# Patient Record
Sex: FEMALE | Race: WHITE | NOT HISPANIC OR LATINO | Employment: PART TIME
[De-identification: names, ages, dates, MRNs, and addresses within clinical notes are randomized per-mention and may not be internally consistent; named-entity substitution may affect disease eponyms.]

---

## 2017-01-09 ENCOUNTER — RECORDS - HEALTHEAST (OUTPATIENT)
Dept: ADMINISTRATIVE | Facility: OTHER | Age: 62
End: 2017-01-09

## 2017-01-23 ENCOUNTER — RECORDS - HEALTHEAST (OUTPATIENT)
Dept: ADMINISTRATIVE | Facility: OTHER | Age: 62
End: 2017-01-23

## 2017-04-17 ENCOUNTER — RECORDS - HEALTHEAST (OUTPATIENT)
Dept: LAB | Facility: CLINIC | Age: 62
End: 2017-04-17

## 2017-04-17 ENCOUNTER — RECORDS - HEALTHEAST (OUTPATIENT)
Dept: ADMINISTRATIVE | Facility: OTHER | Age: 62
End: 2017-04-17

## 2017-04-17 LAB
CHOLEST SERPL-MCNC: 214 MG/DL
FASTING STATUS PATIENT QL REPORTED: YES
HDLC SERPL-MCNC: 97 MG/DL
LDLC SERPL CALC-MCNC: 103 MG/DL
TRIGL SERPL-MCNC: 69 MG/DL

## 2017-04-25 LAB
HPV INTERPRETATION - HISTORICAL: NORMAL
HPV INTERPRETER - HISTORICAL: NORMAL

## 2017-04-28 ENCOUNTER — RECORDS - HEALTHEAST (OUTPATIENT)
Dept: ADMINISTRATIVE | Facility: OTHER | Age: 62
End: 2017-04-28

## 2017-05-26 ENCOUNTER — RECORDS - HEALTHEAST (OUTPATIENT)
Dept: ADMINISTRATIVE | Facility: OTHER | Age: 62
End: 2017-05-26

## 2017-07-06 ENCOUNTER — RECORDS - HEALTHEAST (OUTPATIENT)
Dept: ADMINISTRATIVE | Facility: OTHER | Age: 62
End: 2017-07-06

## 2017-10-25 ENCOUNTER — RECORDS - HEALTHEAST (OUTPATIENT)
Dept: ADMINISTRATIVE | Facility: OTHER | Age: 62
End: 2017-10-25

## 2018-04-26 ENCOUNTER — RECORDS - HEALTHEAST (OUTPATIENT)
Dept: ADMINISTRATIVE | Facility: OTHER | Age: 63
End: 2018-04-26

## 2018-05-07 ENCOUNTER — COMMUNICATION - HEALTHEAST (OUTPATIENT)
Dept: FAMILY MEDICINE | Facility: CLINIC | Age: 63
End: 2018-05-07

## 2018-05-07 ENCOUNTER — OFFICE VISIT - HEALTHEAST (OUTPATIENT)
Dept: FAMILY MEDICINE | Facility: CLINIC | Age: 63
End: 2018-05-07

## 2018-05-07 DIAGNOSIS — M81.0 OSTEOPOROSIS: ICD-10-CM

## 2018-05-07 DIAGNOSIS — Z85.3 PERSONAL HISTORY OF BREAST CANCER: ICD-10-CM

## 2018-05-07 DIAGNOSIS — Z00.00 ROUTINE GENERAL MEDICAL EXAMINATION AT A HEALTH CARE FACILITY: ICD-10-CM

## 2018-05-07 DIAGNOSIS — Z12.11 SCREEN FOR COLON CANCER: ICD-10-CM

## 2018-05-07 LAB
ALBUMIN SERPL-MCNC: 3.8 G/DL (ref 3.5–5)
ALP SERPL-CCNC: 87 U/L (ref 45–120)
ALT SERPL W P-5'-P-CCNC: 24 U/L (ref 0–45)
ANION GAP SERPL CALCULATED.3IONS-SCNC: 11 MMOL/L (ref 5–18)
AST SERPL W P-5'-P-CCNC: 23 U/L (ref 0–40)
BASOPHILS # BLD AUTO: 0 THOU/UL (ref 0–0.2)
BASOPHILS NFR BLD AUTO: 0 % (ref 0–2)
BILIRUB SERPL-MCNC: 0.5 MG/DL (ref 0–1)
BUN SERPL-MCNC: 13 MG/DL (ref 8–22)
CALCIUM SERPL-MCNC: 9.7 MG/DL (ref 8.5–10.5)
CHLORIDE BLD-SCNC: 107 MMOL/L (ref 98–107)
CHOLEST SERPL-MCNC: 221 MG/DL
CO2 SERPL-SCNC: 24 MMOL/L (ref 22–31)
CREAT SERPL-MCNC: 0.65 MG/DL (ref 0.6–1.1)
EOSINOPHIL # BLD AUTO: 0.1 THOU/UL (ref 0–0.4)
EOSINOPHIL NFR BLD AUTO: 2 % (ref 0–6)
ERYTHROCYTE [DISTWIDTH] IN BLOOD BY AUTOMATED COUNT: 10.7 % (ref 11–14.5)
FASTING STATUS PATIENT QL REPORTED: ABNORMAL
GFR SERPL CREATININE-BSD FRML MDRD: >60 ML/MIN/1.73M2
GLUCOSE BLD-MCNC: 95 MG/DL (ref 70–125)
HCT VFR BLD AUTO: 42.2 % (ref 35–47)
HDLC SERPL-MCNC: 100 MG/DL
HGB BLD-MCNC: 14 G/DL (ref 12–16)
LDLC SERPL CALC-MCNC: 105 MG/DL
LYMPHOCYTES # BLD AUTO: 2.5 THOU/UL (ref 0.8–4.4)
LYMPHOCYTES NFR BLD AUTO: 42 % (ref 20–40)
MCH RBC QN AUTO: 31.4 PG (ref 27–34)
MCHC RBC AUTO-ENTMCNC: 33.3 G/DL (ref 32–36)
MCV RBC AUTO: 94 FL (ref 80–100)
MONOCYTES # BLD AUTO: 0.4 THOU/UL (ref 0–0.9)
MONOCYTES NFR BLD AUTO: 6 % (ref 2–10)
NEUTROPHILS # BLD AUTO: 3 THOU/UL (ref 2–7.7)
NEUTROPHILS NFR BLD AUTO: 50 % (ref 50–70)
PLATELET # BLD AUTO: 287 THOU/UL (ref 140–440)
PMV BLD AUTO: 6.5 FL (ref 7–10)
POTASSIUM BLD-SCNC: 4.7 MMOL/L (ref 3.5–5)
PROT SERPL-MCNC: 7.3 G/DL (ref 6–8)
RBC # BLD AUTO: 4.47 MILL/UL (ref 3.8–5.4)
SODIUM SERPL-SCNC: 142 MMOL/L (ref 136–145)
TRIGL SERPL-MCNC: 78 MG/DL
WBC: 6 THOU/UL (ref 4–11)

## 2018-05-07 RX ORDER — CHLORAL HYDRATE 500 MG
2 CAPSULE ORAL DAILY
Status: SHIPPED | COMMUNITY
Start: 2018-05-07

## 2018-05-07 RX ORDER — SPIRONOLACTONE 25 MG
1 TABLET ORAL
Status: SHIPPED | COMMUNITY
Start: 2018-05-07

## 2018-05-07 ASSESSMENT — MIFFLIN-ST. JEOR: SCORE: 1086.59

## 2018-05-09 ENCOUNTER — TELEPHONE (OUTPATIENT)
Dept: OTHER | Facility: CLINIC | Age: 63
End: 2018-05-09

## 2018-05-09 NOTE — TELEPHONE ENCOUNTER
5/9/2018    Call Regarding [facility] choices     Attempt 1    Message on voicemail    Comments:       Outreach   Erica Loving

## 2018-06-05 NOTE — TELEPHONE ENCOUNTER
6/5/2018    Call Regarding Onboarding: [employer]    Attempt 2    Message on voicemail     Comments: no dep      Outreach   SV

## 2018-08-06 NOTE — TELEPHONE ENCOUNTER
8/6/2018    Call Regarding Onboarding are Choices    Attempt 3    Message on voicemail     Comments:       Outreach   Erica Loving

## 2018-08-08 ENCOUNTER — RECORDS - HEALTHEAST (OUTPATIENT)
Dept: ADMINISTRATIVE | Facility: OTHER | Age: 63
End: 2018-08-08

## 2018-08-09 ENCOUNTER — RECORDS - HEALTHEAST (OUTPATIENT)
Dept: ADMINISTRATIVE | Facility: OTHER | Age: 63
End: 2018-08-09

## 2018-09-18 ENCOUNTER — OFFICE VISIT - HEALTHEAST (OUTPATIENT)
Dept: FAMILY MEDICINE | Facility: CLINIC | Age: 63
End: 2018-09-18

## 2018-09-18 DIAGNOSIS — R43.1 ABNORMAL SMELL: ICD-10-CM

## 2018-09-18 DIAGNOSIS — R42 DIZZINESS: ICD-10-CM

## 2018-09-18 DIAGNOSIS — R21 RASH: ICD-10-CM

## 2018-09-18 ASSESSMENT — MIFFLIN-ST. JEOR: SCORE: 1091.13

## 2018-10-01 ENCOUNTER — RECORDS - HEALTHEAST (OUTPATIENT)
Dept: ADMINISTRATIVE | Facility: OTHER | Age: 63
End: 2018-10-01

## 2019-04-02 ENCOUNTER — RECORDS - HEALTHEAST (OUTPATIENT)
Dept: ADMINISTRATIVE | Facility: OTHER | Age: 64
End: 2019-04-02

## 2019-04-26 ENCOUNTER — OFFICE VISIT - HEALTHEAST (OUTPATIENT)
Dept: FAMILY MEDICINE | Facility: CLINIC | Age: 64
End: 2019-04-26

## 2019-04-26 DIAGNOSIS — C50.911 MALIGNANT NEOPLASM OF RIGHT FEMALE BREAST, UNSPECIFIED ESTROGEN RECEPTOR STATUS, UNSPECIFIED SITE OF BREAST (H): ICD-10-CM

## 2019-04-26 DIAGNOSIS — L91.8 SKIN TAG: ICD-10-CM

## 2019-04-26 DIAGNOSIS — J06.9 VIRAL UPPER RESPIRATORY ILLNESS: ICD-10-CM

## 2019-04-26 DIAGNOSIS — F41.9 ANXIETY: ICD-10-CM

## 2019-04-26 DIAGNOSIS — M81.0 AGE-RELATED OSTEOPOROSIS WITHOUT CURRENT PATHOLOGICAL FRACTURE: ICD-10-CM

## 2019-04-26 DIAGNOSIS — L70.0 WHITE HEAD: ICD-10-CM

## 2019-04-26 ASSESSMENT — MIFFLIN-ST. JEOR: SCORE: 1085.23

## 2019-05-14 ENCOUNTER — COMMUNICATION - HEALTHEAST (OUTPATIENT)
Dept: FAMILY MEDICINE | Facility: CLINIC | Age: 64
End: 2019-05-14

## 2020-04-14 ENCOUNTER — COMMUNICATION - HEALTHEAST (OUTPATIENT)
Dept: FAMILY MEDICINE | Facility: CLINIC | Age: 65
End: 2020-04-14

## 2020-04-14 DIAGNOSIS — M81.0 AGE-RELATED OSTEOPOROSIS WITHOUT CURRENT PATHOLOGICAL FRACTURE: ICD-10-CM

## 2020-08-03 ENCOUNTER — RECORDS - HEALTHEAST (OUTPATIENT)
Dept: ADMINISTRATIVE | Facility: OTHER | Age: 65
End: 2020-08-03

## 2020-08-06 ENCOUNTER — RECORDS - HEALTHEAST (OUTPATIENT)
Dept: ADMINISTRATIVE | Facility: OTHER | Age: 65
End: 2020-08-06

## 2020-08-11 ENCOUNTER — RECORDS - HEALTHEAST (OUTPATIENT)
Dept: ADMINISTRATIVE | Facility: OTHER | Age: 65
End: 2020-08-11

## 2020-08-11 ENCOUNTER — COMMUNICATION - HEALTHEAST (OUTPATIENT)
Dept: FAMILY MEDICINE | Facility: CLINIC | Age: 65
End: 2020-08-11

## 2020-08-11 DIAGNOSIS — N63.20 LEFT BREAST MASS: ICD-10-CM

## 2020-09-04 ENCOUNTER — COMMUNICATION - HEALTHEAST (OUTPATIENT)
Dept: SCHEDULING | Facility: CLINIC | Age: 65
End: 2020-09-04

## 2020-09-10 ENCOUNTER — OFFICE VISIT - HEALTHEAST (OUTPATIENT)
Dept: FAMILY MEDICINE | Facility: CLINIC | Age: 65
End: 2020-09-10

## 2020-09-10 DIAGNOSIS — S22.41XA CLOSED FRACTURE OF MULTIPLE RIBS OF RIGHT SIDE, INITIAL ENCOUNTER: ICD-10-CM

## 2020-09-10 DIAGNOSIS — S27.2XXA TRAUMATIC PNEUMOHEMOTHORAX, INITIAL ENCOUNTER: ICD-10-CM

## 2020-09-10 DIAGNOSIS — Z72.0 TOBACCO USE: ICD-10-CM

## 2020-09-11 ENCOUNTER — COMMUNICATION - HEALTHEAST (OUTPATIENT)
Dept: FAMILY MEDICINE | Facility: CLINIC | Age: 65
End: 2020-09-11

## 2020-10-12 ENCOUNTER — COMMUNICATION - HEALTHEAST (OUTPATIENT)
Dept: FAMILY MEDICINE | Facility: CLINIC | Age: 65
End: 2020-10-12

## 2020-10-15 ENCOUNTER — OFFICE VISIT - HEALTHEAST (OUTPATIENT)
Dept: FAMILY MEDICINE | Facility: CLINIC | Age: 65
End: 2020-10-15

## 2020-10-15 DIAGNOSIS — S27.2XXA TRAUMATIC PNEUMOHEMOTHORAX, INITIAL ENCOUNTER: ICD-10-CM

## 2020-10-15 DIAGNOSIS — S22.41XA CLOSED FRACTURE OF MULTIPLE RIBS OF RIGHT SIDE, INITIAL ENCOUNTER: ICD-10-CM

## 2020-10-15 DIAGNOSIS — Z72.0 TOBACCO USE: ICD-10-CM

## 2020-10-15 DIAGNOSIS — M89.8X1 CLAVICLE PAIN: ICD-10-CM

## 2020-10-16 ENCOUNTER — COMMUNICATION - HEALTHEAST (OUTPATIENT)
Dept: FAMILY MEDICINE | Facility: CLINIC | Age: 65
End: 2020-10-16

## 2020-10-19 ENCOUNTER — COMMUNICATION - HEALTHEAST (OUTPATIENT)
Dept: PEDIATRICS | Facility: CLINIC | Age: 65
End: 2020-10-19

## 2020-10-19 ENCOUNTER — COMMUNICATION - HEALTHEAST (OUTPATIENT)
Dept: FAMILY MEDICINE | Facility: CLINIC | Age: 65
End: 2020-10-19

## 2021-03-22 ENCOUNTER — COMMUNICATION - HEALTHEAST (OUTPATIENT)
Dept: ADMINISTRATIVE | Facility: CLINIC | Age: 66
End: 2021-03-22

## 2021-03-22 DIAGNOSIS — M81.0 AGE-RELATED OSTEOPOROSIS WITHOUT CURRENT PATHOLOGICAL FRACTURE: ICD-10-CM

## 2021-03-26 ENCOUNTER — COMMUNICATION - HEALTHEAST (OUTPATIENT)
Dept: ADMINISTRATIVE | Facility: CLINIC | Age: 66
End: 2021-03-26

## 2021-03-26 DIAGNOSIS — M81.0 AGE-RELATED OSTEOPOROSIS WITHOUT CURRENT PATHOLOGICAL FRACTURE: ICD-10-CM

## 2021-03-29 RX ORDER — ALENDRONATE SODIUM 70 MG/1
TABLET ORAL
Qty: 12 TABLET | Refills: 3 | Status: SHIPPED | OUTPATIENT
Start: 2021-03-29 | End: 2022-06-09

## 2021-04-13 ENCOUNTER — OFFICE VISIT - HEALTHEAST (OUTPATIENT)
Dept: FAMILY MEDICINE | Facility: CLINIC | Age: 66
End: 2021-04-13

## 2021-04-13 DIAGNOSIS — B35.3 TINEA PEDIS OF LEFT FOOT: ICD-10-CM

## 2021-04-13 DIAGNOSIS — L30.1 DYSHIDROSIS: ICD-10-CM

## 2021-04-19 ENCOUNTER — COMMUNICATION - HEALTHEAST (OUTPATIENT)
Dept: SCHEDULING | Facility: CLINIC | Age: 66
End: 2021-04-19

## 2021-04-20 ENCOUNTER — OFFICE VISIT - HEALTHEAST (OUTPATIENT)
Dept: FAMILY MEDICINE | Facility: CLINIC | Age: 66
End: 2021-04-20

## 2021-04-20 DIAGNOSIS — R21 BLISTERING RASH: ICD-10-CM

## 2021-04-20 ASSESSMENT — MIFFLIN-ST. JEOR: SCORE: 1068.45

## 2021-04-25 ENCOUNTER — HEALTH MAINTENANCE LETTER (OUTPATIENT)
Age: 66
End: 2021-04-25

## 2021-05-26 ENCOUNTER — OFFICE VISIT - HEALTHEAST (OUTPATIENT)
Dept: FAMILY MEDICINE | Facility: CLINIC | Age: 66
End: 2021-05-26

## 2021-05-26 ENCOUNTER — RECORDS - HEALTHEAST (OUTPATIENT)
Dept: ADMINISTRATIVE | Facility: CLINIC | Age: 66
End: 2021-05-26

## 2021-05-26 DIAGNOSIS — G89.29 CHRONIC RIGHT-SIDED THORACIC BACK PAIN: ICD-10-CM

## 2021-05-26 DIAGNOSIS — Z71.6 ENCOUNTER FOR TOBACCO USE CESSATION COUNSELING: ICD-10-CM

## 2021-05-26 DIAGNOSIS — F41.9 ANXIETY: ICD-10-CM

## 2021-05-26 DIAGNOSIS — M81.0 AGE-RELATED OSTEOPOROSIS WITHOUT CURRENT PATHOLOGICAL FRACTURE: ICD-10-CM

## 2021-05-26 DIAGNOSIS — Z12.31 VISIT FOR SCREENING MAMMOGRAM: ICD-10-CM

## 2021-05-26 DIAGNOSIS — M54.6 CHRONIC RIGHT-SIDED THORACIC BACK PAIN: ICD-10-CM

## 2021-05-26 DIAGNOSIS — Z00.00 ROUTINE GENERAL MEDICAL EXAMINATION AT A HEALTH CARE FACILITY: ICD-10-CM

## 2021-05-26 DIAGNOSIS — Z85.3 PERSONAL HISTORY OF BREAST CANCER: ICD-10-CM

## 2021-05-26 DIAGNOSIS — Z87.81 HISTORY OF RIB FRACTURE: ICD-10-CM

## 2021-05-26 DIAGNOSIS — L30.9 DERMATITIS: ICD-10-CM

## 2021-05-26 LAB
ALBUMIN SERPL-MCNC: 4.3 G/DL (ref 3.5–5)
ALP SERPL-CCNC: 93 U/L (ref 45–120)
ALT SERPL W P-5'-P-CCNC: 36 U/L (ref 0–45)
ANION GAP SERPL CALCULATED.3IONS-SCNC: 13 MMOL/L (ref 5–18)
AST SERPL W P-5'-P-CCNC: 30 U/L (ref 0–40)
BASOPHILS # BLD AUTO: 0.1 THOU/UL (ref 0–0.2)
BASOPHILS NFR BLD AUTO: 1 % (ref 0–2)
BILIRUB SERPL-MCNC: 0.8 MG/DL (ref 0–1)
BUN SERPL-MCNC: 12 MG/DL (ref 8–22)
CALCIUM SERPL-MCNC: 9.4 MG/DL (ref 8.5–10.5)
CHLORIDE BLD-SCNC: 102 MMOL/L (ref 98–107)
CHOLEST SERPL-MCNC: 240 MG/DL
CO2 SERPL-SCNC: 25 MMOL/L (ref 22–31)
CREAT SERPL-MCNC: 0.54 MG/DL (ref 0.6–1.1)
EOSINOPHIL # BLD AUTO: 0.1 THOU/UL (ref 0–0.4)
EOSINOPHIL NFR BLD AUTO: 2 % (ref 0–6)
ERYTHROCYTE [DISTWIDTH] IN BLOOD BY AUTOMATED COUNT: 12.1 % (ref 11–14.5)
FASTING STATUS PATIENT QL REPORTED: YES
GFR SERPL CREATININE-BSD FRML MDRD: >60 ML/MIN/1.73M2
GLUCOSE BLD-MCNC: 87 MG/DL (ref 70–125)
HCT VFR BLD AUTO: 44.1 % (ref 35–47)
HDLC SERPL-MCNC: 123 MG/DL
HGB BLD-MCNC: 14.8 G/DL (ref 12–16)
IMM GRANULOCYTES # BLD: 0 THOU/UL
IMM GRANULOCYTES NFR BLD: 0 %
LDLC SERPL CALC-MCNC: 101 MG/DL
LYMPHOCYTES # BLD AUTO: 2.3 THOU/UL (ref 0.8–4.4)
LYMPHOCYTES NFR BLD AUTO: 35 % (ref 20–40)
MCH RBC QN AUTO: 31.7 PG (ref 27–34)
MCHC RBC AUTO-ENTMCNC: 33.6 G/DL (ref 32–36)
MCV RBC AUTO: 94 FL (ref 80–100)
MONOCYTES # BLD AUTO: 0.7 THOU/UL (ref 0–0.9)
MONOCYTES NFR BLD AUTO: 11 % (ref 2–10)
NEUTROPHILS # BLD AUTO: 3.5 THOU/UL (ref 2–7.7)
NEUTROPHILS NFR BLD AUTO: 52 % (ref 50–70)
PLATELET # BLD AUTO: 221 THOU/UL (ref 140–440)
PMV BLD AUTO: 8.8 FL (ref 7–10)
POTASSIUM BLD-SCNC: 4.7 MMOL/L (ref 3.5–5)
PROT SERPL-MCNC: 7.5 G/DL (ref 6–8)
RBC # BLD AUTO: 4.67 MILL/UL (ref 3.8–5.4)
SODIUM SERPL-SCNC: 140 MMOL/L (ref 136–145)
TRIGL SERPL-MCNC: 78 MG/DL
WBC: 6.7 THOU/UL (ref 4–11)

## 2021-05-26 RX ORDER — BETAMETHASONE DIPROPIONATE 0.05 %
OINTMENT (GRAM) TOPICAL
Status: SHIPPED | COMMUNITY
Start: 2021-05-20 | End: 2022-06-09

## 2021-05-26 RX ORDER — LORAZEPAM 0.5 MG/1
TABLET ORAL
Qty: 60 TABLET | Refills: 0 | Status: SHIPPED | OUTPATIENT
Start: 2021-05-26 | End: 2023-07-12

## 2021-05-26 ASSESSMENT — MIFFLIN-ST. JEOR: SCORE: 1058.01

## 2021-05-27 ENCOUNTER — RECORDS - HEALTHEAST (OUTPATIENT)
Dept: ADMINISTRATIVE | Facility: CLINIC | Age: 66
End: 2021-05-27

## 2021-05-27 LAB — 25(OH)D3 SERPL-MCNC: 41.2 NG/ML (ref 30–80)

## 2021-05-28 ENCOUNTER — RECORDS - HEALTHEAST (OUTPATIENT)
Dept: ADMINISTRATIVE | Facility: CLINIC | Age: 66
End: 2021-05-28

## 2021-05-28 NOTE — TELEPHONE ENCOUNTER
FYI - Status Update  Who is Calling: Patient  Update: The patient returned the call to clinic and received the previous message regarding her Osteoporosis with high fracture risk. She was instructed to continue Fosamax and repeat her DEXA in 2 years.  Okay to leave a detailed message?:  No return call needed

## 2021-05-28 NOTE — TELEPHONE ENCOUNTER
Left message for pt call back and relay message from Dr. Lopez:    Still osteoporosis with high fracture risk and recommend staying on fosamax and repeat bone density in 2 years.

## 2021-05-28 NOTE — PROGRESS NOTES
Family Medicine Office Visit  [facility]  Patient Name: Nancy Phillips  Patient Age: 63 y.o.  YOB: 1955  MRN: [id number]    Date of Visit: 2019  Reason for Office Visit:   Chief Complaint   Patient presents with     med check     Fosamex      small white head near white eye     skin tag on right arm     x3 days cough           Assessment / Plan / Medical Decision Makin. Age-related osteoporosis without current pathological fracture  Refill medication and DEXA ordered  - alendronate (FOSAMAX) 70 MG tablet; Take 1 tablet (70 mg total) by mouth once a week.  Dispense: 12 tablet; Refill: 3  - DXA Bone Density Scan; Future  - DXA Bone Density Scan    2. Skin tag  Excised without complication.  Pt given discharge instructions  - lidocaine 1%-EPINEPHrine 1:100,000 1 %-1:100,000 injection 1 mL (XYLOCAINE W/EPI)    3. White head  Continue with warm compresses and if would like to see dermatology then call for referral    4. Anxiety  Controlled substance contract signed and  reviewed.  - LORazepam (ATIVAN) 0.5 MG tablet; 1-2 tabs orally once a day as needed  For anxiety  Dispense: 60 tablet; Refill: 0    5. Malignant neoplasm of right female breast, unspecified estrogen receptor status, unspecified site of breast (H)  Resolved - stable dx in     6. Viral upper respiratory illness  Continue with conservative treatments and call if any worsening symptoms or no improvement in the next few days.        Health Maintenance Review  Health Maintenance   Topic Date Due     ADVANCE DIRECTIVES DISCUSSED WITH PATIENT  1975     ZOSTER VACCINES (1 of 2) 2007     INFLUENZA VACCINE RULE BASED (1) 2018     MAMMOGRAM  2021     PAP SMEAR  2022     TD 18+ HE  2028     COLONOSCOPY  2028     TDAP ADULT ONE TIME DOSE  Completed         I have discontinued Nancy Phillips's triamcinolone and cetirizine. I have also changed her  LORazepam and alendronate. Additionally, I am having her maintain her lutein, MULTIVITAMIN ORAL, b complex vitamins, fish oil-omega-3 fatty acids, calcium carbonate-vitamin D3, and fluticasone propionate. We administered (lidocaine 1%-EPINEPHrine 1:100,000).      HPI:  Nancy Phillips is a 63 y.o. year old who presents to the office today for med check.  Sore throat and scratching, slight cough.  Runny nose that is clear.  No ear fullness or popping. Dull HA.  No fevers or chills      Skin tag on the right arm at the antecubital fossa.  Catching on things.  No surrounding inflammation or erythema.     Area prepped in the usual fashion.  1cc of 1% lidocaine with epinephrine injected intradermally and then the tag was removed with dermablade.  No bleeding or complications.  bandaid applied and discharge instructions given.      White spot on the lower eyelid on the right eye.  Not growing in size.  Using warm compresses.    Hx of anxiety and doing well with it currently.  Has a few ativan to take if needed for panic attacks but with review of the  no refills in the past year.      Review of Systems- pertinent positive in bold:  Constitutional: Fever, chills, night sweats, fainting, weight change, fatigue, seizures, dizziness, sleeping difficulties, loud snoring/pauses in breathing  Eyes: change in vision, blurred or double vision, redness/eye pain  Ears, nose, mouth, throat: change in hearing, ear pain, hoarseness, difficulty swallowing, sores in the mouth or throat  Respiratory: shortness of breath, cough, bloody sputum, wheezing  Cardiovascular: chest pain, palpitations   Gastrointestinal: abdominal pain, heartburn/indigestion, nausea/vomiting, change in appetite, change in bowel habits, constipation or diarrhea, rectal bleeding/dark stools, difficulty swallowing  Urinary: painful urination, frequent urination, urinary urgency/incontinence, blood in urine/dark urine, nocturia  Musculoskeletal: backache/back pain  (new or increasing), weakness, joint pain/stiffness (new or increasing), muscle cramps, swelling of hands, feet, ankles, leg pain/redness  Skin: change in moles/freckles, rash, nodules  Hematologic/lymphatic: swollen lymph glands, abnormal bruising/bleeding  Endocrine: excessive thirst/urination, cold or heat intolerance  Neurologic/emotional: worrisome memory change, numbness/tingling, anxiety, mood swings      Current Scheduled Meds:  Outpatient Encounter Medications as of 4/26/2019   Medication Sig Dispense Refill     alendronate (FOSAMAX) 70 MG tablet Take 1 tablet (70 mg total) by mouth once a week. 12 tablet 3     b complex vitamins tablet Take 1 tablet by mouth daily.       calcium carbonate-vitamin D3 (CALCIUM 600 + D,3,) 600 mg(1,500mg) -400 unit per tablet Take 1 tablet by mouth daily.       fluticasone propionate (CUTIVATE) 0.05 % cream 1 APPLICATION TWICE A DAY TOPICALLY X 1 WEEK  2     LORazepam (ATIVAN) 0.5 MG tablet 1-2 tabs orally once a day as needed  For anxiety 60 tablet 0     lutein 20 mg cap Take 1 capsule by mouth every other day.       MULTIVITAMIN ORAL Take 1 tablet by mouth daily.       omega-3/dha/epa/fish oil (FISH OIL-OMEGA-3 FATTY ACIDS) 300-1,000 mg capsule Take 2 g by mouth daily.       [DISCONTINUED] alendronate (FOSAMAX) 70 MG tablet Take 1 tablet by mouth once a week.  3     [DISCONTINUED] LORazepam (ATIVAN) 0.5 MG tablet Take by mouth. 1-2 tabs orally once a day as needed  For anxiety       [DISCONTINUED] cetirizine (ZYRTEC) 10 MG tablet Take 1 tablet (10 mg total) by mouth daily. 30 tablet 2     [DISCONTINUED] triamcinolone (KENALOG) 0.1 % ointment Apply topically 2 (two) times a day. 30 g 0     Facility-Administered Encounter Medications as of 4/26/2019   Medication Dose Route Frequency Provider Last Rate Last Dose     [COMPLETED] lidocaine 1%-EPINEPHrine 1:100,000 1 %-1:100,000 injection 1 mL (XYLOCAINE W/EPI)  1 mL Intradermal Once Catarina Lopez MD   20 mL at 04/26/19 8232  "    Past Medical History:   Diagnosis Date     Breast cancer (H) 2006     Hx of radiation therapy      Osteoporosis      Past Surgical History:   Procedure Laterality Date     BREAST BIOPSY       BREAST LUMPECTOMY       Social History     Tobacco Use     Smoking status: Current Every Day Smoker     Packs/day: 0.25     Years: 45.00     Pack years: 11.25     Smokeless tobacco: Never Used   Substance Use Topics     Alcohol use: Yes     Alcohol/week: 3.6 - 4.8 oz     Types: 6 - 8 Shots of liquor per week     Drug use: No       Objective / Physical Examination:  Vitals:    04/26/19 0944   BP: 106/62   Patient Site: Right Arm   Patient Position: Sitting   Cuff Size: Adult Regular   Pulse: 76   SpO2: 97%   Weight: 123 lb 11.2 oz (56.1 kg)   Height: 5' 3\" (1.6 m)     Wt Readings from Last 3 Encounters:   04/26/19 123 lb 11.2 oz (56.1 kg)   09/18/18 125 lb (56.7 kg)   05/07/18 124 lb (56.2 kg)     BP Readings from Last 3 Encounters:   04/26/19 106/62   09/18/18 126/72   05/07/18 98/64     Body mass index is 21.91 kg/m .   Results for orders placed or performed in visit on 05/07/18   Lipid Cascade RANDOM   Result Value Ref Range    Cholesterol 221 (H) <=199 mg/dL    Triglycerides 78 <=149 mg/dL    HDL Cholesterol 100 >=50 mg/dL    LDL Calculated 105 <=129 mg/dL    Patient Fasting > 8hrs? Unknown    Comprehensive Metabolic Panel   Result Value Ref Range    Sodium 142 136 - 145 mmol/L    Potassium 4.7 3.5 - 5.0 mmol/L    Chloride 107 98 - 107 mmol/L    CO2 24 22 - 31 mmol/L    Anion Gap, Calculation 11 5 - 18 mmol/L    Glucose 95 70 - 125 mg/dL    BUN 13 8 - 22 mg/dL    Creatinine 0.65 0.60 - 1.10 mg/dL    GFR MDRD Af Amer >60 >60 mL/min/1.73m2    GFR MDRD Non Af Amer >60 >60 mL/min/1.73m2    Bilirubin, Total 0.5 0.0 - 1.0 mg/dL    Calcium 9.7 8.5 - 10.5 mg/dL    Protein, Total 7.3 6.0 - 8.0 g/dL    Albumin 3.8 3.5 - 5.0 g/dL    Alkaline Phosphatase 87 45 - 120 U/L    AST 23 0 - 40 U/L    ALT 24 0 - 45 U/L   HM1 (CBC with Diff) "   Result Value Ref Range    WBC 6.0 4.0 - 11.0 thou/uL    RBC 4.47 3.80 - 5.40 mill/uL    Hemoglobin 14.0 12.0 - 16.0 g/dL    Hematocrit 42.2 35.0 - 47.0 %    MCV 94 80 - 100 fL    MCH 31.4 27.0 - 34.0 pg    MCHC 33.3 32.0 - 36.0 g/dL    RDW 10.7 (L) 11.0 - 14.5 %    Platelets 287 140 - 440 thou/uL    MPV 6.5 (L) 7.0 - 10.0 fL    Neutrophils % 50 50 - 70 %    Lymphocytes % 42 (H) 20 - 40 %    Monocytes % 6 2 - 10 %    Eosinophils % 2 0 - 6 %    Basophils % 0 0 - 2 %    Neutrophils Absolute 3.0 2.0 - 7.7 thou/uL    Lymphocytes Absolute 2.5 0.8 - 4.4 thou/uL    Monocytes Absolute 0.4 0.0 - 0.9 thou/uL    Eosinophils Absolute 0.1 0.0 - 0.4 thou/uL    Basophils Absolute 0.0 0.0 - 0.2 thou/uL           General Appearance: Alert and oriented, cooperative, affect appropriate, speech clear, in no apparent distress  Head: Normocephalic, atraumatic  Ears: Tympanic membrane clear with landmarks well visualized bilaterally  Eyes: PERRL, fundi appear clear bilaterally. EOMI. Conjunctivae clear and sclerae non-icteric  Nose: Septum midline, nares patent, no visible polyps, mucosa moist and without drainage  Throat: Lips and mucosa moist. Teeth in good repair, pharynx without erythema or exudate  Neck: Supple, trachea midline. No cervical adenopathy  Lungs: Clear to auscultation bilaterally. Normal inspiratory and expiratory effort  Cardiovascular: Regular rate, normal S1, S2. No murmurs, rubs, or gallops  Abdomen: Bowel sounds active all four quadrants. Soft, non-tender. No hepatomegaly or splenomegaly. No bruits detected.   Extremities: Pulses 2+ and equal throughout. No edema. Strength equal throughout.  Integumentary: Warm and dry. Without suspicious looking lesions.  Small 1cm skin tag on the right antecubital fossa  Neuro: Alert and oriented, follows commands appropriately.     Orders Placed This Encounter   Procedures     DXA Bone Density Scan   Followup: No follow-ups on file. earlier if needed.    Total time spent with  patient was 30 min with >50% of time spent in face-to-face counseling regarding the above plan       Catarina Lopez MD

## 2021-05-28 NOTE — PROGRESS NOTES
Still osteoporosis with high fracture risk and recommend staying on fosamax and repeat bone density in 2 years.

## 2021-05-28 NOTE — TELEPHONE ENCOUNTER
----- Message from Catarina Lopez MD sent at 5/13/2019  1:04 PM CDT -----  Still osteoporosis with high fracture risk and recommend staying on fosamax and repeat bone density in 2 years.

## 2021-05-29 ENCOUNTER — RECORDS - HEALTHEAST (OUTPATIENT)
Dept: ADMINISTRATIVE | Facility: CLINIC | Age: 66
End: 2021-05-29

## 2021-05-30 ENCOUNTER — RECORDS - HEALTHEAST (OUTPATIENT)
Dept: ADMINISTRATIVE | Facility: CLINIC | Age: 66
End: 2021-05-30

## 2021-05-31 ENCOUNTER — RECORDS - HEALTHEAST (OUTPATIENT)
Dept: ADMINISTRATIVE | Facility: CLINIC | Age: 66
End: 2021-05-31

## 2021-06-01 ENCOUNTER — COMMUNICATION - HEALTHEAST (OUTPATIENT)
Dept: FAMILY MEDICINE | Facility: CLINIC | Age: 66
End: 2021-06-01

## 2021-06-01 VITALS — WEIGHT: 124 LBS | BODY MASS INDEX: 21.97 KG/M2 | HEIGHT: 63 IN

## 2021-06-02 ENCOUNTER — RECORDS - HEALTHEAST (OUTPATIENT)
Dept: ADMINISTRATIVE | Facility: CLINIC | Age: 66
End: 2021-06-02

## 2021-06-02 VITALS — BODY MASS INDEX: 22.15 KG/M2 | WEIGHT: 125 LBS | HEIGHT: 63 IN

## 2021-06-03 VITALS — HEIGHT: 63 IN | WEIGHT: 123.7 LBS | BODY MASS INDEX: 21.92 KG/M2

## 2021-06-04 VITALS
DIASTOLIC BLOOD PRESSURE: 62 MMHG | HEART RATE: 76 BPM | BODY MASS INDEX: 20.9 KG/M2 | WEIGHT: 118 LBS | SYSTOLIC BLOOD PRESSURE: 110 MMHG | RESPIRATION RATE: 12 BRPM

## 2021-06-05 VITALS
DIASTOLIC BLOOD PRESSURE: 70 MMHG | TEMPERATURE: 98.1 F | OXYGEN SATURATION: 99 % | HEART RATE: 70 BPM | WEIGHT: 121.5 LBS | BODY MASS INDEX: 21.52 KG/M2 | SYSTOLIC BLOOD PRESSURE: 110 MMHG

## 2021-06-05 VITALS
HEART RATE: 93 BPM | DIASTOLIC BLOOD PRESSURE: 82 MMHG | OXYGEN SATURATION: 96 % | SYSTOLIC BLOOD PRESSURE: 128 MMHG | TEMPERATURE: 98 F | HEIGHT: 63 IN | WEIGHT: 120 LBS | BODY MASS INDEX: 21.26 KG/M2

## 2021-06-07 NOTE — TELEPHONE ENCOUNTER
RN cannot approve Refill Request    RN can NOT refill this medication Protocol failed and NO refill given.       Linnea Arvizu, [facility] Triage/Med Refill 4/15/2020    Requested Prescriptions   Pending Prescriptions Disp Refills     alendronate (FOSAMAX) 70 MG tablet [Pharmacy Med Name: ALENDRONATE 70MG TABLETS] 12 tablet 3     Sig: TAKE 1 TABLET BY MOUTH ONCE WEEKLY ON EMPTY STOMACH WITH WATER AS DIRECTED       Biphosphonates Refill Protocol Failed - 4/14/2020  1:14 PM        Failed - Serum creatinine in last 12 months     Creatinine   Date Value Ref Range Status   05/07/2018 0.65 0.60 - 1.10 mg/dL Final             Passed - PCP or prescribing provider visit in last 12 months     Last office visit with prescriber/PCP: 4/26/2019 Catarina Lopez MD OR same dept: 4/26/2019 Catarina Lopez MD OR same specialty: 4/26/2019 Catarina Lopez MD  Last physical: Visit date not found Last MTM visit: Visit date not found   Next visit within 3 mo: Visit date not found  Next physical within 3 mo: Visit date not found  Prescriber OR PCP: Catarina Lopez MD  Last diagnosis associated with med order: 1. Age-related osteoporosis without current pathological fracture  - alendronate (FOSAMAX) 70 MG tablet [Pharmacy Med Name: ALENDRONATE 70MG TABLETS]; TAKE 1 TABLET BY MOUTH ONCE WEEKLY ON EMPTY STOMACH WITH WATER AS DIRECTED  Dispense: 12 tablet; Refill: 3    If protocol passes may refill for 12 months if within 3 months of last provider visit (or a total of 15 months).

## 2021-06-10 NOTE — TELEPHONE ENCOUNTER
Who is calling:  Nina  Reason for Call:  Caller stated the patient was in today/ caller stated they have sent over some abnormal result to Catarina Lopez MD  and would like a order for Ultrasound left breast biopsy.  Date of last appointment with primary care: n/a  Okay to leave a detailed message: Yes  [phone]

## 2021-06-10 NOTE — TELEPHONE ENCOUNTER
See scanned document 8/3    Teed up order for left breat biopsy please adjust as needed (unsure if this is the correct order    Last office visit 4/26/2019  1. Age-related osteoporosis without current pathological fracture  Refill medication and DEXA ordered  - alendronate (FOSAMAX) 70 MG tablet; Take 1 tablet (70 mg total) by mouth once a week.  Dispense: 12 tablet; Refill: 3  - DXA Bone Density Scan; Future  - DXA Bone Density Scan     2. Skin tag  Excised without complication.  Pt given discharge instructions  - lidocaine 1%-EPINEPHrine 1:100,000 1 %-1:100,000 injection 1 mL (XYLOCAINE W/EPI)     3. White head  Continue with warm compresses and if would like to see dermatology then call for referral     4. Anxiety  Controlled substance contract signed and  reviewed.  - LORazepam (ATIVAN) 0.5 MG tablet; 1-2 tabs orally once a day as needed  For anxiety  Dispense: 60 tablet; Refill: 0     5. Malignant neoplasm of right female breast, unspecified estrogen receptor status, unspecified site of breast (H)  Resolved - stable dx in 2006

## 2021-06-11 NOTE — PATIENT INSTRUCTIONS - HE
Let's re check your chest xray to monitor the hemopneumothorax today.  I'll call you with the results of this.      You can continue to use the oxycodone / acetaminophen 5/325mg tablets at bedtime for another week, then I'd try to decrease the dose to 1/2 tablet at bedtime for another week, and then stop.    You should continue to use the OTC ibuprofen 600mg every 6 hours as needed and acetaminophen 1000mg up to twice daily as needed.      Please schedule a follow up with Dr Lopez in 4-6 weeks

## 2021-06-11 NOTE — TELEPHONE ENCOUNTER
"RN triage call from patient  She reports that she fell 9/1/20 Tuesday night  Onto concrete steps  Right sided rib shoulder bruising and pain  Hurts to move certain ways this morning pain was \"excrutiating\" has used 600 mg of Advil this morning  She wonders if she \"cracked a rib\"  Denies trouble breathing or chest pain  Advised ER evaluation and patient was agreeable she mentions she will have her daughter take her to [place] [facility] later today  Advised to call back with other questions or concerns reviewed home cares  Call back if trouble breathing or worsening symptoms  Patient stated understanding  Marlyn Nunez, RN  [facility] Triage Nurse  10:04 AM  9/4/2020          Reason for Disposition    Sounds like a serious injury to the triager    Protocols used: BACK INJURY-A-OH      "

## 2021-06-11 NOTE — PROGRESS NOTES
Subjective     Nancy Phillips is a 65 y.o. female who presents to clinic today for the following health issues:    HPI   Follow up ER visit with multiple rib fractures -patient had sustained multiple right rib fractures and hemopneumothorax following fall while intoxicated on 9/1/20 - patient was evaluated in the ER on 9/4/20, 3 days after her fall. CT head normal. CT chest, abdomen, pelvis showed small right hemopneumothorax and multiple mild and moderately displaced right rib fractures and mildly displaced transverse process fractures of L2, L3, and L4. ER had recommended admission for observation, but patient declined. She was advised to schedule follow-up with her PCP on 9/8 with repeat chest xray and clinical exam to recheck hemopneumothorax. She was discharged with Rx for oxycodone/acetaminophen 5/325mg to be used up to ever 4 hours as needed for severe pain (qty 13 prescribed). Additional assessment for alcoholism was not pursued in the ER.     Since discharge, patient reports feeling gradual improvement in her chest wall pain.  She has continued to use the oxycodone/acetaminophen, particularly at bedtime, when she has been using 1 to 2 tablets.  She has otherwise been using over-the-counter ibuprofen and acetaminophen, which have provided her with sufficient pain relief during daytime.  She has been trying to be vigilant about taking deep breaths in and out.  She notes no significant shortness of breath.      She admits that she had been drinking too much the day of her fall.  Since the fall occurred, she has not had any alcohol intake, and has no plans to resume drinking in the near future.  She does not feel that she is dependent on alcohol.  No history of legal or occupational issues relating to alcohol use.  Others have not expressed concerns about her drinking.  Patient smokes 1/4 ppd.  She does not feel ready to quit at present.     Little interest or pleasure in doing things: Not at all  Feeling  down, depressed, or hopeless: Not at all     Patient Active Problem List   Diagnosis     Breast Cancer     Osteoporosis     Personal history of breast cancer     Past Surgical History:   Procedure Laterality Date     BREAST BIOPSY       BREAST LUMPECTOMY         Social History     Tobacco Use     Smoking status: Current Every Day Smoker     Packs/day: 0.25     Years: 45.00     Pack years: 11.25     Smokeless tobacco: Never Used   Substance Use Topics     Alcohol use: Yes     Alcohol/week: 6.0 - 8.0 standard drinks     Types: 6 - 8 Shots of liquor per week     Family History   Problem Relation Age of Onset     Cancer Father      Colon cancer Paternal Uncle      Leukemia Mother      No Medical Problems Sister      No Medical Problems Daughter      No Medical Problems Maternal Grandmother      No Medical Problems Maternal Grandfather      No Medical Problems Paternal Grandmother      No Medical Problems Paternal Grandfather      No Medical Problems Maternal Aunt      No Medical Problems Paternal Aunt      BRCA 1/2 Neg Hx      Breast cancer Neg Hx      Endometrial cancer Neg Hx      Ovarian cancer Neg Hx          Current Outpatient Medications   Medication Sig Dispense Refill     alendronate (FOSAMAX) 70 MG tablet TAKE 1 TABLET BY MOUTH ONCE WEEKLY ON EMPTY STOMACH WITH WATER AS DIRECTED 12 tablet 3     b complex vitamins tablet Take 1 tablet by mouth daily.       calcium carbonate-vitamin D3 (CALCIUM 600 + D,3,) 600 mg(1,500mg) -400 unit per tablet Take 1 tablet by mouth daily.       fluticasone propionate (CUTIVATE) 0.05 % cream 1 APPLICATION TWICE A DAY TOPICALLY X 1 WEEK  2     LORazepam (ATIVAN) 0.5 MG tablet 1-2 tabs orally once a day as needed  For anxiety 60 tablet 0     lutein 20 mg cap Take 1 capsule by mouth every other day.       MULTIVITAMIN ORAL Take 1 tablet by mouth daily.       omega-3/dha/epa/fish oil (FISH OIL-OMEGA-3 FATTY ACIDS) 300-1,000 mg capsule Take 2 g by mouth daily.        oxyCODONE-acetaminophen (PERCOCET/ENDOCET) 5-325 mg per tablet Take 1 tablet by mouth every 4 (four) hours as needed for pain. 13 tablet 0     No current facility-administered medications for this visit.      No Known Allergies    Reviewed and updated as needed this visit by Provider  History     Not marked as reviewed during this visit.        Review of Systems   Negative except as noted above in HPI.       Objective    /62   Pulse 76   Resp 12   Wt 118 lb (53.5 kg)   BMI 20.90 kg/m    Body mass index is 20.9 kg/m .  Physical Exam   Physical Examination: General appearance - oriented to person, place, and time and appears mildly uncomfortable.   Mental status - normal mood, behavior, speech, dress, motor activity, and thought processes  Chest - clear to auscultation, no wheezes, rales or rhonchi, symmetric air entry  Heart - normal rate, regular rhythm, normal S1, S2, no murmurs, rubs, clicks or gallops  Musculoskeletal - tender to palpation at the right lower ribs at mid axillary line and at the posterior right lower ribs.    Skin - no significant bruising noted at the right chest wall/back.     CXR:   INDICATION: follow up CT chest of 9/4/20 showing small right hemopneumothorax  and multiple rib fractures after fall on 9/1/20  COMPARISON: CT 09/04/2020     IMPRESSION: The right seventh, eighth, ninth and 10th displaced rib fractures  are visualized. No right pneumothorax. Small amount right inferior pleural  fluid.     Heart size appears normal. Left lung appears clear.      Assessment & Plan     Problem List Items Addressed This Visit     None      Visit Diagnoses     Closed fracture of multiple ribs of right side, initial encounter    -  Primary    Relevant Medications    oxyCODONE-acetaminophen (PERCOCET/ENDOCET) 5-325 mg per tablet    Other Relevant Orders    XR Chest 2 Views (Completed)    Traumatic pneumohemothorax, initial encounter        Relevant Medications    oxyCODONE-acetaminophen  (PERCOCET/ENDOCET) 5-325 mg per tablet    Other Relevant Orders    XR Chest 2 Views (Completed)    Tobacco use            Patient Instructions   Let's re check your chest xray to monitor the hemopneumothorax today.  I'll call you with the results of this.      You can continue to use the oxycodone / acetaminophen 5/325mg tablets at bedtime for another week, then I'd try to decrease the dose to 1/2 tablet at bedtime for another week, and then stop.    You should continue to use the OTC ibuprofen 600mg every 6 hours as needed and acetaminophen 1000mg up to twice daily as needed.      Please schedule a follow up with Dr Lopez in 4-6 weeks     Patient is provided with reassurance that CXR performed today was noted to show rib fractures to be unchanged from previous, but no right sided pneumothorax present.    Recommend pain control as noted above.   Follow-up advised with PCP in 4-6 weeks with repeat chest xray at that time.     Tobacco Cessation:   reports that she has been smoking. She has a 11.25 pack-year smoking history. She has never used smokeless tobacco.  I have counseled the patient for tobacco cessation and the follow up will occur  at the next visit.    Return in about 6 weeks (around 10/22/2020) for repeat chest xray.    Yossi Lloyd MD  [facility]

## 2021-06-12 NOTE — TELEPHONE ENCOUNTER
Who is calling:  Nancy  Reason for Call:  Did Dr. Lopez return [place] paperwork to [facility]?    Date of last appointment with primary care: 10/15/20  Okay to leave a detailed message: Yes

## 2021-06-12 NOTE — TELEPHONE ENCOUNTER
Test Results  Who is calling?:  Patient   Who ordered the test:  Catarina Lopez MD   Type of test: Imaging  Date of test:  10/15/2020  Where was the test performed:  [facility]  What are your questions/concerns?:  Patient is wanting results.  Okay to leave a detailed message?:  Yes   [phone]

## 2021-06-12 NOTE — TELEPHONE ENCOUNTER
Who is calling:  Patient   Reason for Call:  Patient stated that her social sercurity number is on one of the form and wants to make sure the form is shredded when everything goes thru. Patient is questioning if the form was every faxed. Patient stated she got a call from her employer they never received the form.  Date of last appointment with primary care: n/a  Okay to leave a detailed message: Yes  [phone]

## 2021-06-12 NOTE — TELEPHONE ENCOUNTER
----- Message from Catarina Lopez MD sent at 10/19/2020  7:50 AM CDT -----  Still some healing fractures.  No fracture of the clavicle seen.  Ok to return to work

## 2021-06-12 NOTE — TELEPHONE ENCOUNTER
Called to pt and pt state sthat she wants to go to work on Monday       She s wondering about her Xrays from 10/15/2020  XR Shoulder Right 2 or More VWS  EXAM DATE:         10/15/2020    EXAM: X-RAY SHOULDER RIGHT, MINIMUM 2 VIEWS  LOCATION: [facility]  DATE/TIME: 10/15/2020 11:45 AM    INDICATION: Multiple fractures of ribs, right side, initial encounter for closed  fracture Traumatic hemopneumothorax, initial encounter  COMPARISON: 9/4/2020 CT chest abdomen and pelvis. 10/15/2020 chest x-ray.    IMPRESSION: No acute fractures are evident. No humeral or scapular fractures are  evident. Normal glenohumeral alignment. The acromioclavicular joint is  unremarkable. Multiple subacute healing right rib fractures.  XR Chest 2 Views  EXAM DATE:         10/15/2020    EXAM: X-RAY CHEST, 2 VIEWS, FRONTAL AND LATERAL  LOCATION: [facility]  DATE/TIME: 10/15/2020 11:30 AM    INDICATION: Multiple fractures of ribs, right side, initial encounter for closed  fracture Traumatic hemopneumothorax, initial encounter  COMPARISON: 09/10/2020    IMPRESSION: Again noted are fractures of the right posterior seventh through  10th rib fractures. Alignment is unchanged. No pneumothorax or pleural effusion.  No focal consolidation. The heart size and pulmonary vascularity are normal. The  small right pleural effusion seen on the prior study has resolved.

## 2021-06-12 NOTE — TELEPHONE ENCOUNTER
Call placed to patient regarding xray results. Please relay results to patient when she calls back.    Letter also sent in mail to patient.

## 2021-06-12 NOTE — PROGRESS NOTES
"Nancy Phillips is a 65 y.o. female who is being evaluated via a billable telephone visit.      The patient has been notified of following:     \"This telephone visit will be conducted via a call between you and your physician/provider. We have found that certain health care needs can be provided without the need for a physical exam.  This service lets us provide the care you need with a short phone conversation.  If a prescription is necessary we can send it directly to your pharmacy.  If lab work is needed we can place an order for that and you can then stop by our lab to have the test done at a later time.    Telephone visits are billed at different rates depending on your insurance coverage. During this emergency period, for some insurers they may be billed the same as an in-person visit.  Please reach out to your insurance provider with any questions.    If during the course of the call the physician/provider feels a telephone visit is not appropriate, you will not be charged for this service.\"    Patient has given verbal consent to a Telephone visit? Yes    What phone number would you like to be contacted at? [phone]    Patient would like to receive their AVS by AVS Preference: Mail a copy.    Family Medicine [facility]  Patient Name: Nancy Phillips  Patient Age: 65 y.o.  YOB: 1955  MRN: [id number]    Date of Visit: 10/15/2020  Reason for Office Visit:   Chief Complaint   Patient presents with     Follow-up     Would like order for a chest x ray, would like to get back to work            Assessment / Plan / Medical Decision Making:      Health Maintenance Review  Health Maintenance   Topic Date Due     HEPATITIS C SCREENING  1955     Pneumococcal Vaccine: Pediatrics (0 to 5 Years) and At-Risk Patients (6 to 64 Years) (1 of 1 - PPSV23) 08/31/1963     HIV SCREENING  08/31/1972     ADVANCE CARE PLANNING  08/31/1975     ZOSTER VACCINES " (1 of 2) 08/31/2007     PREVENTIVE CARE VISIT  05/07/2019     INFLUENZA VACCINE RULE BASED (1) 08/01/2020     PAP SMEAR  04/20/2022     HPV TEST  04/20/2022     MAMMOGRAM  08/03/2022     LIPID  05/07/2023     TD 18+ HE  05/07/2028     COLORECTAL CANCER SCREENING  08/08/2028     TDAP ADULT ONE TIME DOSE  Completed     HEPATITIS B VACCINES  Aged Out         I am having Nancy Phillips maintain her lutein, MULTIVITAMIN ORAL, b complex vitamins, fish oil-omega-3 fatty acids, calcium carbonate-vitamin D3, fluticasone propionate, LORazepam, alendronate, and oxyCODONE-acetaminophen.      HPI:  Nancy Phillips is a 65 y.o. year old who has a telephone visit for follow up on fall.  Rib fractures notes - still feels like there is rubbing.  Still not working and scheduled to go back to work Monday.  Currently taking care of special needs granddaughter and having to pick her up and put her in the car seat.  Part time at [employer].  Starting back with daughter on Monday.  Back is doing better - trying to stand up straighter.  Taking 600mg advil in the morning and helping with the pain.  Able to get out and do yard work.    EXAM: CT CHEST ABDOMEN PELVIS WO ORAL W IV CONTRAST  LOCATION: [facility]  DATE/TIME: 9/4/2020 7:08 PM     INDICATION: fall, right lateral chest wall pain, RUQ pain, right flank pain  COMPARISON: None.  TECHNIQUE: CT scan of the chest, abdomen, and pelvis was performed following injection of IV contrast. Multiplanar reformats were obtained. Dose reduction techniques were used.   CONTRAST: Iohexol (Omni) 100 mL     FINDINGS:   LUNGS AND PLEURA: There is a right hemopneumothorax. No mediastinal shift.     Segmental atelectasis of the posterior segment right lower lobe related to right pleural fluid. Benign calcified granuloma in the medial right middle lobe. Band of subpleural atelectasis in the periphery of the right middle lobe. Left lung is   well-expanded without focal opacity. There is a globule of  secretions in the trachea. The central airways are otherwise patent.     MEDIASTINUM/AXILLAE: Cardiac chambers are not enlarged. No pericardial effusion. There are no pulmonary artery filling defects. Normal caliber thoracic aorta. No findings to suggest acute aortic injury. No thoracic lymphadenopathy.     HEPATOBILIARY: Normal. No laceration.     PANCREAS: Normal.     SPLEEN: Normal.     ADRENAL GLANDS: Normal.     KIDNEYS/BLADDER: Kidneys are normal in size. No nephrolithiasis or obstructive uropathy. A few bilateral cortical renal cysts required no additional workup or follow-up.     BOWEL: Stomach is decompressed. Normal caliber small bowel. Stool is present in the cecum and ascending colon. The remaining colon contains minimal stool and air. No bowel wall thickening. No pneumatosis or pneumoperitoneum. No peritoneal free fluid.     LYMPH NODES: Normal.     VASCULATURE: Atheromatous plaque in the abdominal aorta and iliac arteries without aneurysm. Mildly dilated parametrial vessels in left ovarian vein.     PELVIC ORGANS: Uterus is not enlarged. No mass in the adnexa. No pelvic free fluid.     MUSCULOSKELETAL: Right fifth, sixth, and seventh ribs are fractured laterally. The right seventh, eighth, ninth, 10th and 11th ribs are fractured with at least one shaft width displacement and over a centimeter of overriding. The posterior 10th rib is   comminuted. The 10th and 12th ribs are fractured at the costovertebral junction. Right L2, L3, and L4 transverse processes are fractured with mild displacement.     IMPRESSION:      1.  Small right hemopneumothorax.  2.  Multiple mild and moderately displaced right rib fractures. Ribs 8, 9, and 10 are fractured in multiple locations which raises question of flail chest.. The right posterior 10th rib is moderately comminuted.  3.  Mildly displaced transverse process fractures of L2, L3, and L4.  4.  No traumatic injuries to the abdominal viscera.     Critical Result:  Pneumothorax     Finding was identified on 9/4/2020 at 7:45 PM.     Dr. De Santiago in [facility] emergency department was contacted by me on 9/4/2020 7:49 PM and verbalized understanding of the critical result.     Review of Systems- pertinent positive in bold:  Constitutional: Fever, chills, night sweats, fainting, weight change, fatigue, seizures, dizziness, sleeping difficulties, loud snoring/pauses in breathing  Eyes: change in vision, blurred or double vision, redness/eye pain  Ears, nose, mouth, throat: change in hearing, ear pain, hoarseness, difficulty swallowing, sores in the mouth or throat  Respiratory: shortness of breath, cough, bloody sputum, wheezing  Cardiovascular: chest pain, palpitations   Gastrointestinal: abdominal pain, heartburn/indigestion, nausea/vomiting, change in appetite, change in bowel habits, constipation or diarrhea, rectal bleeding/dark stools, difficulty swallowing  Urinary: painful urination, frequent urination, urinary urgency/incontinence, blood in urine/dark urine, nocturia  Musculoskeletal: backache/back pain (new or increasing), weakness, joint pain/stiffness (new or increasing), muscle cramps, swelling of hands, feet, ankles, leg pain/redness  Skin: change in moles/freckles, rash, nodules  Hematologic/lymphatic: swollen lymph glands, abnormal bruising/bleeding  Endocrine: excessive thirst/urination, cold or heat intolerance  Neurologic/emotional: worrisome memory change, numbness/tingling, anxiety, mood swings      Current Scheduled Meds:  Outpatient Encounter Medications as of 10/15/2020   Medication Sig Dispense Refill     alendronate (FOSAMAX) 70 MG tablet TAKE 1 TABLET BY MOUTH ONCE WEEKLY ON EMPTY STOMACH WITH WATER AS DIRECTED 12 tablet 3     b complex vitamins tablet Take 1 tablet by mouth daily.       calcium carbonate-vitamin D3 (CALCIUM 600 + D,3,) 600 mg(1,500mg) -400 unit per tablet Take 1 tablet by mouth daily.       LORazepam (ATIVAN) 0.5 MG tablet 1-2 tabs orally  once a day as needed  For anxiety 60 tablet 0     lutein 20 mg cap Take 1 capsule by mouth every other day.       omega-3/dha/epa/fish oil (FISH OIL-OMEGA-3 FATTY ACIDS) 300-1,000 mg capsule Take 2 g by mouth daily.       oxyCODONE-acetaminophen (PERCOCET/ENDOCET) 5-325 mg per tablet Take 1.5 tablets by mouth at bedtime as needed for pain. 20 tablet 0     fluticasone propionate (CUTIVATE) 0.05 % cream 1 APPLICATION TWICE A DAY TOPICALLY X 1 WEEK  2     MULTIVITAMIN ORAL Take 1 tablet by mouth daily.       No facility-administered encounter medications on file as of 10/15/2020.      Past Medical History:   Diagnosis Date     Breast cancer (H) 2006     Hx of radiation therapy      Osteoporosis      Past Surgical History:   Procedure Laterality Date     BREAST BIOPSY       BREAST LUMPECTOMY       Social History     Tobacco Use     Smoking status: Current Every Day Smoker     Packs/day: 0.25     Years: 45.00     Pack years: 11.25     Smokeless tobacco: Never Used   Substance Use Topics     Alcohol use: Yes     Alcohol/week: 6.0 - 8.0 standard drinks     Types: 6 - 8 Shots of liquor per week     Drug use: No       Objective / Physical Examination:  There were no vitals filed for this visit.  Wt Readings from Last 3 Encounters:   09/10/20 118 lb (53.5 kg)   09/04/20 116 lb (52.6 kg)   04/26/19 123 lb 11.2 oz (56.1 kg)     BP Readings from Last 3 Encounters:   09/10/20 110/62   09/04/20 129/85   04/26/19 106/62     There is no height or weight on file to calculate BMI.   Results for orders placed or performed during the hospital encounter of 09/04/20   Comprehensive Metabolic Panel   Result Value Ref Range    Sodium 138 136 - 145 mmol/L    Potassium 4.1 3.5 - 5.0 mmol/L    Chloride 104 98 - 107 mmol/L    CO2 24 22 - 31 mmol/L    Anion Gap, Calculation 10 5 - 18 mmol/L    Glucose 112 70 - 125 mg/dL    BUN 12 8 - 22 mg/dL    Creatinine 0.64 0.60 - 1.10 mg/dL    GFR MDRD Af Amer >60 >60 mL/min/1.73m2    GFR MDRD Non Af Amer  >60 >60 mL/min/1.73m2    Bilirubin, Total 0.6 0.0 - 1.0 mg/dL    Calcium 9.6 8.5 - 10.5 mg/dL    Protein, Total 7.6 6.0 - 8.0 g/dL    Albumin 3.9 3.5 - 5.0 g/dL    Alkaline Phosphatase 78 45 - 120 U/L    AST 28 0 - 40 U/L    ALT 31 0 - 45 U/L   HM2(CBC w/o Differential)   Result Value Ref Range    WBC 7.9 4.0 - 11.0 thou/uL    RBC 4.16 3.80 - 5.40 mill/uL    Hemoglobin 13.3 12.0 - 16.0 g/dL    Hematocrit 39.4 35.0 - 47.0 %    MCV 95 80 - 100 fL    MCH 32.0 27.0 - 34.0 pg    MCHC 33.8 32.0 - 36.0 g/dL    RDW 12.3 11.0 - 14.5 %    Platelets 231 140 - 440 thou/uL    MPV 9.2 8.5 - 12.5 fL   INR   Result Value Ref Range    INR 1.01 0.90 - 1.10   Alcohol, Ethyl, Blood   Result Value Ref Range    Alcohol, Blood <10 None detected mg/dL   Magnesium   Result Value Ref Range    Magnesium 2.0 1.8 - 2.6 mg/dL   Urinalysis-UC if Indicated   Result Value Ref Range    Color, UA Yellow Colorless, Yellow, Straw, Light Yellow    Clarity, UA Clear Clear    Glucose, UA Negative Negative    Bilirubin, UA Negative Negative    Ketones, UA Trace (!) Negative, 60 mg/dL    Specific Gravity, UA 1.024 1.001 - 1.030    Blood, UA Negative Negative    pH, UA 7.0 4.5 - 8.0    Protein, UA Negative Negative mg/dL    Urobilinogen, UA <2.0 E.U./dL <2.0 E.U./dL, 2.0 E.U./dL    Nitrite, UA Negative Negative    Leukocytes, UA Negative Negative           General Appearance: Alert and oriented, cooperative, affect appropriate, speech clear, in no apparent distress  Neuro: Alert and oriented, follows commands appropriately.     Orders Placed This Encounter   Procedures     XR Chest 2 Views     XR Shoulder Right 2 or More VWS   Followup: Return in about 3 months (around 1/15/2021) for Recheck. earlier if needed.        Catarina Lopez MD      Assessment/Plan:  1. Closed fracture of multiple ribs of right side, initial encounter  Will repeat CXR and call with the results.  Recommend continued conservative management and call if any questions or concerns.  Ok  to return to work.  - XR Chest 2 Views    2. Traumatic pneumohemothorax, initial encounter  Will continue to monitor.  Resolved as of last encounter  - XR Chest 2 Views    3. Tobacco use  Discussed smoking cessation    4. Clavicle pain  Will ensure no other issues going on with clavicle.  - XR Shoulder Right 2 or More VWS; Future  - XR Shoulder Right 2 or More VWS        Phone call duration:  15 minutes    Catarina Lopez MD

## 2021-06-12 NOTE — TELEPHONE ENCOUNTER
Patient came in dropped off paper work for up coming appt with PCP please complete and fax to the number on the form.  Please Advise Form was place in PCP box .     Giana Cain

## 2021-06-16 PROBLEM — Z85.3 PERSONAL HISTORY OF BREAST CANCER: Status: ACTIVE | Noted: 2018-05-07

## 2021-06-16 NOTE — PROGRESS NOTES
Assessment / Plan  1. Blistering rash at palms  and soles  Ambulatory referral to Dermatology - [facility]    Culture/Gram Stain: Wound   Patient with significantly worsening blistering rash present at the palms and soles bilaterally.  Uncertain etiology of current changes.  Patient is provided with a urgent referral to see dermatology for additional evaluation.  [facility] on her way out of clinic today to see what their availability is.  If unable to be evaluated there, our referral specialist will reach out to her this afternoon to review alternate options for evaluation within the next couple of days.     Discussed differential diagnosis including severe bullous tinea pedis/manus, palmoplantar pustulosis, and contact dermatitis. Recommend that patient discontinue use of supplements given question of worsening of rash since beginning to take a collagen supplement. Additionally, given that the rash seems to be worsening significantly since beginning treatment with triamcinolone and topical antifungal, patient is advised to hold off on using these, as well.    Patient had been reporting severe discomfort related to a large bulla present at the center of her left foot.  Drainage was requested to aid in ability to ambulate.  After cleansing with isopropyl alcohol, sterile number 18-gauge needle was used to open the bulla and serosanguineous drainage was expressed from the blister.  I had recommended the patient consider bacterial culture of the wound present at the left plantar foot, however patient declines this recommendation today.    Patient Instructions   I placed a referral to dermatology during today's visit. You should expect to receive a call from one of our specialty schedulers later today. If you do not hear from them today, please call the specialty scheduling line at [phone]. Please leave a voicemail on the line and someone should return your  voicemail in approximately 24 hours.    I would hold off on adding anything to the soaks that you are doing for the feet (just use clean warm water).  I would also hold off on applying the antifungal and steroid ointments until you visit with dermatology.     Return for Follow up after visiting with dermatology.     30 minutes spent on the date of the encounter doing chart review, history and exam, documentation and further activities per the note.      Subjective   Nancy Phillips is a 65 y.o. year old female who presents with the following concerns:     Bilateral foot pain with worsening blistering rash at palms and soles - patient was seen by me one week ago with concern regarding potential fungal infection between the toes, along with development of small clear blisters noted at the palmar hands and feet.  I had recommended that she treat with topical ketoconazole and triamcinolone combination.  She returns to clinic today noting significant worsening in the rashes present at the palms and soles with significant increase in the size of the blisters present at both palms and the left sole of the foot.  The left foot has also shown a significant amount of desquamation and distal toes have appeared much more erythematous than they had been a week ago.  No recent fevers or chills.  Patient does admit that she had started taking a new collagen supplement just over a week ago.  She has since discontinued its use.  No other new medications.  Had initially thought that symptoms could be related to exposure to something in her yard, however blistering rash has now been progressing over the last couple of weeks.    Patient Active Problem List   Diagnosis     Breast Cancer     Osteoporosis     Personal history of breast cancer     Past Surgical History:   Procedure Laterality Date     BREAST BIOPSY       BREAST LUMPECTOMY         Social History     Tobacco Use     Smoking status: Current Every Day Smoker     Packs/day:  0.25     Years: 45.00     Pack years: 11.25     Smokeless tobacco: Never Used   Substance Use Topics     Alcohol use: Yes     Alcohol/week: 6.0 - 8.0 standard drinks     Types: 6 - 8 Shots of liquor per week     Family History   Problem Relation Age of Onset     Cancer Father      Colon cancer Paternal Uncle      Leukemia Mother      No Medical Problems Sister      No Medical Problems Daughter      No Medical Problems Maternal Grandmother      No Medical Problems Maternal Grandfather      No Medical Problems Paternal Grandmother      No Medical Problems Paternal Grandfather      No Medical Problems Maternal Aunt      No Medical Problems Paternal Aunt      BRCA 1/2 Neg Hx      Breast cancer Neg Hx      Endometrial cancer Neg Hx      Ovarian cancer Neg Hx          Current Outpatient Medications   Medication Sig Dispense Refill     alendronate (FOSAMAX) 70 MG tablet TAKE 1 TABLET BY MOUTH ONCE WEEKLY ON EMPTY STOMACH WITH WATER AS DIRECTED 12 tablet 3     b complex vitamins tablet Take 1 tablet by mouth daily.       calcium carbonate-vitamin D3 (CALCIUM 600 + D,3,) 600 mg(1,500mg) -400 unit per tablet Take 1 tablet by mouth daily.       ketoconazole (NIZORAL) 2 % cream Apply to affected areas of foot/toes twice daily.  Continue for a few days once rash appears to have resolved. 60 g 0     LORazepam (ATIVAN) 0.5 MG tablet 1-2 tabs orally once a day as needed  For anxiety 60 tablet 0     lutein 20 mg cap Take 1 capsule by mouth every other day.       MULTIVITAMIN ORAL Take 1 tablet by mouth daily.       omega-3/dha/epa/fish oil (FISH OIL-OMEGA-3 FATTY ACIDS) 300-1,000 mg capsule Take 2 g by mouth daily.       triamcinolone (KENALOG) 0.1 % ointment Apply to affected areas of skin twice daily as needed for itching (avoid use on face and genitals) 60 g 0     No current facility-administered medications for this visit.      No Known Allergies    ROS:   Negative except as noted above in HPI.     Objective  /82   Pulse  "93   Temp 98  F (36.7  C)   Ht 5' 3\" (1.6 m)   Wt 120 lb (54.4 kg)   SpO2 96%   BMI 21.26 kg/m       General: Alert, no acute distress.   Affect: pleasant, cooperative.  Skin:  Palmar surfaces show presence of deep blisters/bullae filled with what appears to be serosanguinous fluid.  Additionally, there are scattered erythematous patches.   Left plantar surface shows significant amounts of desquamation present at the second through fifth toes extending proximately approximately one quarter of the way up the foot.  Distal toes show scattered erythematous patches.  No drainage is noted from areas of desquamation.  Also present at the center of left foot is a 1.5 inch large tense bulla.          Yossi Lloyd MD   Family medicine physician  [facility]     "

## 2021-06-16 NOTE — PATIENT INSTRUCTIONS - HE
I placed a referral to dermatology during today's visit. You should expect to receive a call from one of our specialty schedulers later today. If you do not hear from them today, please call the specialty scheduling line at [phone]. Please leave a voicemail on the line and someone should return your voicemail in approximately 24 hours.    I would hold off on adding anything to the soaks that you are doing for the feet (just use clean warm water).  I would also hold off on applying the antifungal and steroid ointments until you visit with dermatology.

## 2021-06-16 NOTE — TELEPHONE ENCOUNTER
Can you resend the medication to [facility]. Medication has been jolene'd up with correct pharmacy.

## 2021-06-16 NOTE — TELEPHONE ENCOUNTER
Pt is following up on refill request made on 03/22/2021.    Alendronate 70 MG    Provider note indicates refill was authorized.    Pt did request the RX to be sent to [facility] ([facility]) however, the RX was sent to [facility]    Pt is requesting RX is sent to [facility].     Pt requesting call back when RX has been sent to [facility]

## 2021-06-16 NOTE — PATIENT INSTRUCTIONS - HE
I would recommend treating the fungal infection between the toes with an antifungal cream called ketoconazole applied once to twice daily.  You will likely need to continue this for at least a couple of weeks.      I would recommend treatment for the rash and blisters noted at the hands, feet and arms with a topical steroid called triamcinolone.  This could be applied twice daily as needed.  I would avoid using it on the face or genital area.  Additionally  I would recommend you try taking a daily dose of an over-the-counter non-sedating antihistamine such as Claritin, Allegra or Zyrtec to further help with the itching.      Please contact me if not responding to recommended treatments or if noting any new or worsening symptoms.

## 2021-06-16 NOTE — TELEPHONE ENCOUNTER
Last virtual Visit  10/15/2020-Dr. Lopez   Notes:  1. Closed fracture of multiple ribs of right side, initial encounter  Will repeat CXR and call with the results.  Recommend continued conservative management and call if any questions or concerns.  Ok to return to work.  - XR Chest 2 Views     2. Traumatic pneumohemothorax, initial encounter  Will continue to monitor.  Resolved as of last encounter  - XR Chest 2 Views     3. Tobacco use  Discussed smoking cessation     4. Clavicle pain  Will ensure no other issues going on with clavicle.  - XR Shoulder Right 2 or More VWS; Future  - XR Shoulder Right 2 or More VWS    Last Filled:  alendronate (FOSAMAX) 70 MG tablet 12 tablet 3 4/15/2020  No   Sig: TAKE 1 TABLET BY MOUTH ONCE WEEKLY ON EMPTY STOMACH WITH WATER AS DIRECTED   Sent to pharmacy as: alendronate 70 mg tablet (FOSAMAX)   E-Prescribing Status: Receipt confirmed by pharmacy (4/15/2020  9:57 AM CDT)       Next OV:  Visit date not found        Medication teed up for provider signature

## 2021-06-16 NOTE — TELEPHONE ENCOUNTER
Refill Request  Did you contact pharmacy: Yes  Medication name:   alendronate    Who prescribed the medication: pcp  Requested [facility]: [facility]  Is patient out of medication: Yes  Patient notified refills processed in 3 business days:  yes  Okay to leave a detailed message: yes      Pt would also like to schedule PHY appt with PCP.  She will be available by phone later this afternoon after 3PM.

## 2021-06-17 NOTE — PROGRESS NOTES
Assessment / Plan  1. Tinea pedis of left foot  ketoconazole (NIZORAL) 2 % cream   2. Dyshidrosis  triamcinolone (KENALOG) 0.1 % ointment       Patient Instructions   I would recommend treating the fungal infection between the toes with an antifungal cream called ketoconazole applied once to twice daily.  You will likely need to continue this for at least a couple of weeks.      I would recommend treatment for the rash and blisters noted at the hands, feet and arms with a topical steroid called triamcinolone.  This could be applied twice daily as needed.  I would avoid using it on the face or genital area.  Additionally  I would recommend you try taking a daily dose of an over-the-counter non-sedating antihistamine such as Claritin, Allegra or Zyrtec to further help with the itching.      Please contact me if not responding to recommended treatments or if noting any new or worsening symptoms.     Return in about 2 weeks (around 4/27/2021) for Follow up if not improving .     Subjective   Nancy Phillips is a 65 y.o. year old female who presents with the following concerns:     Rash - patient reports acute onset of rash present primarily at the palmar hands and plantar feet.  She notes that it is quite itchy.  In addition, she has noted development of some scattered tiny red raised bumps on the arms. She had thought that rash may have been brought on by something she came into contact with while doing some yard work.  She notes that on palms and soles she is seeing tiny clear blisters scattered and more diffuse erythema.  At the soles of the feet she is also seeing diffuse dry peeling skin.  She notes mild discomfort at the bilateral feet.  Patient reports no history of similar rashes that she can recall.  She does not recall any recent changes to her personal hygiene products.  She has been applying regular lotion to the affected areas liberally.  No recent changes to her medications.    Patient Active Problem List    Diagnosis     Breast Cancer     Osteoporosis     Personal history of breast cancer     Past Surgical History:   Procedure Laterality Date     BREAST BIOPSY       BREAST LUMPECTOMY         Social History     Tobacco Use     Smoking status: Current Every Day Smoker     Packs/day: 0.25     Years: 45.00     Pack years: 11.25     Smokeless tobacco: Never Used   Substance Use Topics     Alcohol use: Yes     Alcohol/week: 6.0 - 8.0 standard drinks     Types: 6 - 8 Shots of liquor per week     Family History   Problem Relation Age of Onset     Cancer Father      Colon cancer Paternal Uncle      Leukemia Mother      No Medical Problems Sister      No Medical Problems Daughter      No Medical Problems Maternal Grandmother      No Medical Problems Maternal Grandfather      No Medical Problems Paternal Grandmother      No Medical Problems Paternal Grandfather      No Medical Problems Maternal Aunt      No Medical Problems Paternal Aunt      BRCA 1/2 Neg Hx      Breast cancer Neg Hx      Endometrial cancer Neg Hx      Ovarian cancer Neg Hx          Current Outpatient Medications   Medication Sig Dispense Refill     alendronate (FOSAMAX) 70 MG tablet TAKE 1 TABLET BY MOUTH ONCE WEEKLY ON EMPTY STOMACH WITH WATER AS DIRECTED 12 tablet 3     b complex vitamins tablet Take 1 tablet by mouth daily.       calcium carbonate-vitamin D3 (CALCIUM 600 + D,3,) 600 mg(1,500mg) -400 unit per tablet Take 1 tablet by mouth daily.       LORazepam (ATIVAN) 0.5 MG tablet 1-2 tabs orally once a day as needed  For anxiety 60 tablet 0     lutein 20 mg cap Take 1 capsule by mouth every other day.       MULTIVITAMIN ORAL Take 1 tablet by mouth daily.       omega-3/dha/epa/fish oil (FISH OIL-OMEGA-3 FATTY ACIDS) 300-1,000 mg capsule Take 2 g by mouth daily.       ketoconazole (NIZORAL) 2 % cream Apply to affected areas of foot/toes twice daily.  Continue for a few days once rash appears to have resolved. 60 g 0     triamcinolone (KENALOG) 0.1 %  ointment Apply to affected areas of skin twice daily as needed for itching (avoid use on face and genitals) 60 g 0     No current facility-administered medications for this visit.      No Known Allergies    ROS:   Negative except as noted above in HPI.     Objective  /70 (Patient Site: Right Arm, Patient Position: Sitting, Cuff Size: Adult Regular)   Pulse 70   Temp 98.1  F (36.7  C) (Oral)   Wt 121 lb 8 oz (55.1 kg)   SpO2 99%   BMI 21.52 kg/m       General: Alert, no acute distress.   Affect: pleasant, cooperative.  Skin: scattered 1-2mm clear vesicles noted at palmar surface of several toes and fingers.  Bilateral forearms show several 2 mm slightly raised erythematous papules with overlying excoriations.  Palmar surface of hands is diffusely mildly erythematous without swelling, warmth, or tenderness.  Plantar surface of feet shows diffuse erythema in a moccasin distribution with overlying mild flaking/peeling skin.  Minimal macerated skin noted between toes at the bilateral feet.        Yossi Lloyd MD   Family medicine physician  [facility]

## 2021-06-17 NOTE — PROGRESS NOTES
Family Medicine Office Visit  [facility]  Patient Name: Nancy Phillips  Patient Age: 62 y.o.  YOB: 1955  MRN: [id number]    Date of Visit: 2018  Reason for Office Visit:   Chief Complaint   Patient presents with     Establish Care     No health concerns            Assessment / Plan / Medical Decision Makin. Routine general medical examination at a health care facility  Routine labs recommeneded and will call with the results.  Discussed shingrix and Tdap given today.  - Lipid [place] RANDOM  - Comprehensive Metabolic Panel  - HM1(CBC and Differential)  - HM1 (CBC with Diff)    2. Screen for colon cancer  Referral placed and scheduling will call with the appointment  - Ambulatory referral for Colonoscopy    3. Personal history of breast cancer - well controlled  Mammogram UTD    4. Osteoporosis - stable  Continue with fosamax        Health Maintenance Review  Health Maintenance   Topic Date Due     TD 18+ HE  1975     TDAP ADULT ONE TIME DOSE  1975     ADVANCE DIRECTIVES DISCUSSED WITH PATIENT  1975     COLONOSCOPY  2007     ZOSTER VACCINE  2017     MAMMOGRAM  10/14/2017     INFLUENZA VACCINE RULE BASED (Season Ended) 2018     PAP SMEAR  2022         I am having Ms. Phillips maintain her alendronate, LORazepam, lutein, MULTIVITAMIN ORAL, b complex vitamins, fish oil-omega-3 fatty acids, and calcium carbonate-vitamin D3.      HPI:  Nancy Phillips is a 62 y.o. year old who presents to the office today for establish care.  Pt has a PMH of breast cancer with radiation and osteoporosis.  She is on fosamax now again after stopping it for a period of time.  Not currently sexually active.  Mammogram and pap UTD.  Discussed shingrix today.  Feeling good.  Sleeping well.  Few spider veins in left lower extremity but no problems otherwise.        Review of Systems- pertinent positive in bold:  Constitutional: Fever,  chills, night sweats, fainting, weight change, fatigue, seizures, dizziness, sleeping difficulties, loud snoring/pauses in breathing  Eyes: change in vision, blurred or double vision, redness/eye pain  Ears, nose, mouth, throat: change in hearing, ear pain, hoarseness, difficulty swallowing, sores in the mouth or throat  Respiratory: shortness of breath, cough, bloody sputum, wheezing  Cardiovascular: chest pain, palpitations   Gastrointestinal: abdominal pain, heartburn/indigestion, nausea/vomiting, change in appetite, change in bowel habits, constipation or diarrhea, rectal bleeding/dark stools, difficulty swallowing  Urinary: painful urination, frequent urination, urinary urgency/incontinence, blood in urine/dark urine, nocturia  Musculoskeletal: backache/back pain (new or increasing), weakness, joint pain/stiffness (new or increasing), muscle cramps, swelling of hands, feet, ankles, leg pain/redness  Skin: change in moles/freckles, rash, nodules  Hematologic/lymphatic: swollen lymph glands, abnormal bruising/bleeding  Endocrine: excessive thirst/urination, cold or heat intolerance  Neurologic/emotional: worrisome memory change, numbness/tingling, anxiety, mood swings      Current Scheduled Meds:  Outpatient Encounter Prescriptions as of 5/7/2018   Medication Sig Dispense Refill     alendronate (FOSAMAX) 70 MG tablet Take 1 tablet by mouth once a week.  3     b complex vitamins tablet Take 1 tablet by mouth daily.       calcium carbonate-vitamin D3 (CALCIUM 600 + D,3,) 600 mg(1,500mg) -400 unit per tablet Take 1 tablet by mouth daily.       LORazepam (ATIVAN) 0.5 MG tablet Take by mouth. 1-2 tabs orally once a day as needed  For anxiety       lutein 20 mg cap Take 1 capsule by mouth every other day.       MULTIVITAMIN ORAL Take 1 tablet by mouth daily.       omega-3/dha/epa/fish oil (FISH OIL-OMEGA-3 FATTY ACIDS) 300-1,000 mg capsule Take 2 g by mouth daily.       No facility-administered encounter medications on  "file as of 5/7/2018.      Past Medical History:   Diagnosis Date     Breast cancer 2006     Hx of radiation therapy      Osteoporosis      Past Surgical History:   Procedure Laterality Date     BREAST BIOPSY       BREAST LUMPECTOMY       Social History   Substance Use Topics     Smoking status: Current Every Day Smoker     Packs/day: 0.25     Years: 45.00     Smokeless tobacco: Never Used     Alcohol use 3.6 - 4.8 oz/week     6 - 8 Shots of liquor per week       Objective / Physical Examination:  Vitals:    05/07/18 1006   BP: 98/64   Pulse: 72   SpO2: 97%   Weight: 124 lb (56.2 kg)   Height: 5' 3\" (1.6 m)     Wt Readings from Last 3 Encounters:   05/07/18 124 lb (56.2 kg)     BP Readings from Last 3 Encounters:   05/07/18 98/64     Body mass index is 21.97 kg/(m^2).     General Appearance: Alert and oriented, cooperative, affect appropriate, speech clear, in no apparent distress  Head: Normocephalic, atraumatic  Ears: Tympanic membrane clear with landmarks well visualized bilaterally  Eyes: PERRL, fundi appear clear bilaterally. EOMI. Conjunctivae clear and sclerae non-icteric  Nose: Septum midline, nares patent, no visible polyps, mucosa moist and without drainage  Throat: Lips and mucosa moist. Teeth in good repair, pharynx without erythema or exudate  Neck: Supple, trachea midline. No cervical adenopathy  Back: Symmetrical and nontender  Lungs: Clear to auscultation bilaterally. Normal inspiratory and expiratory effort  Cardiovascular: Regular rate, normal S1, S2. No murmurs, rubs, or gallops  Abdomen: Bowel sounds active all four quadrants. Soft, non-tender. No hepatomegaly or splenomegaly. No bruits detected.   Extremities: Pulses 2+ and equal throughout. No edema. Strength equal throughout.  Integumentary: Warm and dry. Without suspicious looking lesions  Neuro: Alert and oriented, follows commands appropriately.     Orders Placed This Encounter   Procedures     Tdap vaccine,  6yo or older,  IM     Lipid " [place] RANDOM     Comprehensive Metabolic Panel     HM1 (CBC with Diff)     Ambulatory referral for Colonoscopy   Followup: Return in about 1 year (around 5/7/2019). earlier if needed.        Catarina Lopez MD

## 2021-06-19 NOTE — LETTER
Letter by Catarina Lopez MD at      Author: Catarina Lopez MD Service: -- Author Type: --    Filed:  Encounter Date: 4/26/2019 Status: (Other)         [facility]  04/26/19    Patient: Nancy Phillips  YOB: 1955  Medical Record Number: [id number]  CSN: [id number]                                                                              Non-opioid Controlled Substance Agreement    I understand that my care provider has prescribed a controlled substance to help manage my condition(s). I am taking this medicine to help me function or work. I know this is strong medicine, and that it can cause serious side effects. Controlled substances can be sedating, addicting and may cause a dependency on the drug. They can affect my ability to drive or think, and cause depression. They need to be taken exactly as prescribed. Combining controlled substances with certain medicines or chemicals (such as cocaine, sedatives and tranquilizers, sleeping pills, meth) can be dangerous or even fatal. Also, if I stop controlled substances suddenly, I may have severe withdrawal symptoms.  If not helpful, I may be asked to stop them.    The risks, benefits, and side effects of these medicine(s) were explained to me. I agree that:    1. I will take part in other treatments as advised by my care team. This may be psychiatry or counseling, physical therapy, behavioral therapy, group treatment or a referral to a pain clinic. I will reduce or stop my medicine when my care team tells me to do so.  2. I will take my medicines as prescribed. I will not change the dose or schedule unless my care team tells me to. There will be no refills if I run out early.  I may be contactedwithout warning and asked to complete a urine drug test or pill count at any time.   3. I will keep all my appointments, and understand this is part of the monitoring of controlled substances. My care team may require an office visit for EVERY  controlled substance refill. If I miss appointments or dont follow instructions, my care team may stop my medicine.  4. I will not ask other providers to prescribe controlled substances, and I will not accept controlled substances from other people. If I need another prescribed controlled substance for a new reason, I will tell my care team within 1 business day.  5. I will use one pharmacy to fill all of my controlled substance prescriptions, and it is up to me to make sure that I do not run out of my medicines on weekends or holidays. If my care team is willing to refill my controlled substance prescription without a visit, I must request refills only during office hours, refills may take up to 3 days to process, and it may take up to 5 to 7 days for my medicine to be mailed and ready at my pharmacy. Prescriptions will not be mailed anywhere except my pharmacy.    6. I am responsible for my prescriptions. If the medicine/prescription is lost or stolen, it will not be replaced. I also agree not to share controlled substance medicines with anyone.          [facility]  04/26/19  Patient:  Nancy Phillips  YOB: 1955  Medical Record Number: [id number]  CSN: [id number]    7. I agree to not use ANY illegal or recreational drugs. This includes marijuana, cocaine, bath salts or other drugs. I agree not to use alcohol unless my care team says I may. I agree to give urine samples whenever asked. If I dont give a urine sample, the care team may stop my medicine.    8. If I enroll in the [place] Medical Marijuana program, I will tell my care team. I will also sign an agreement to share my medical records with my care team.    9. I will bring in my list of medicines (or my medicine bottles) each time I come to the clinic.   10. I will tell my care team right away if I become pregnant or have a new medical problem treated outside of my regular clinic.  11. I understand that this medicine can affect my  thinking and judgment. It may be unsafe for me to drive, use machinery and do dangerous tasks. I will not do any of these things until I know how the medicine affects me. If my dose changes, I will wait to see how it affects me. I will contact my care team if I have concerns about medicine side effects.    I understand that if I do not follow any of the conditions above, my prescriptions or treatment may be stopped.      I agree that my provider, clinic care team, and pharmacy may work with any city, state or federal law enforcement agency that investigates the misuse, sale, or other diversion of my controlled medicine. I will allow my provider to discuss my care with or share a copy of this agreement with any other treating provider, pharmacy or emergency room where I receive care. I agree to give up (waive) any right of privacy or confidentiality with respect to these consents.   I have read this agreement and have asked questions about anything I did not understand.    ___________________________________________________________________________  Patient signature - Date/Time  -Nancy Phillips                                      ___________________________________________________________________________  Witness signature                                                                    ___________________________________________________________________________  Provider signature- Catarina Lopez MD

## 2021-06-20 NOTE — PROGRESS NOTES
Family Medicine Office Visit  [facility]  Patient Name: Nancy Phillips  Patient Age: 63 y.o.  YOB: 1955  MRN: [id number]    Date of Visit: 2018  Reason for Office Visit:   Chief Complaint   Patient presents with     Rash     Putting Hydrocortisone cream on the rash on the face and it has been getting better. Has some strange mushrooms inthe garden and had touched her face with hands.      Dizziness     Ongoing since end of aug 2018. Vertigo daily. Has gotten better. Worse when laying down and the skull felt heavy and sinking into the pillow. Changing side to side in bed made it worse.            Assessment / Plan / Medical Decision Makin. Dizziness  Thought potentially due to congestion in the head and trial of zyrtec and see if any improvement    2. Rash  Trial of triamcinolone cream and follow up with dermatology.      3. Abnormal smell  Discussed potential causes and will try allergy medication first and see if any improvement.  If not then plan for ?sinusitis workup vs. allergy      Health Maintenance Review  Health Maintenance   Topic Date Due     ADVANCE DIRECTIVES DISCUSSED WITH PATIENT  1975     ZOSTER VACCINE  2017     MAMMOGRAM  10/14/2017     INFLUENZA VACCINE RULE BASED (1) 2018     PAP SMEAR  2022     TD 18+ HE  2028     COLONOSCOPY  2028     TDAP ADULT ONE TIME DOSE  Completed         I am having Ms. Phillips start on triamcinolone and cetirizine. I am also having her maintain her alendronate, LORazepam, lutein, MULTIVITAMIN ORAL, b complex vitamins, fish oil-omega-3 fatty acids, and calcium carbonate-vitamin D3.      HPI:  Nancy Phillips is a 63 y.o. year old who presents to the office today for rash on face and neck.  Started about 2 weeks ago and been using hydrocortisone cream and seems to be improving.  Dermatology appointment 10/1.  Thinks due to touching mushrooms in the front yard.  Not  "itchy but burns when putting moisturizer on the lesions.  Feels like the eyelid is \"puffy\".  No change in vision.  Was taking claritin but stopped      Feels like smelling smoke all the time.     Dizziness from time to time.  Worse with laying down.  Heavy feels heavy and dizziness when moving the head from side to side.  No room spinning.  No falls, right ear initially buzzing but now improved.        Review of Systems- pertinent positive in bold:  Constitutional: Fever, chills, night sweats, fainting, weight change, fatigue, seizures, dizziness, sleeping difficulties, loud snoring/pauses in breathing  Eyes: change in vision, blurred or double vision, redness/eye pain  Ears, nose, mouth, throat: change in hearing, ear pain, hoarseness, difficulty swallowing, sores in the mouth or throat  Respiratory: shortness of breath, cough, bloody sputum, wheezing  Cardiovascular: chest pain, palpitations   Gastrointestinal: abdominal pain, heartburn/indigestion, nausea/vomiting, change in appetite, change in bowel habits, constipation or diarrhea, rectal bleeding/dark stools, difficulty swallowing  Urinary: painful urination, frequent urination, urinary urgency/incontinence, blood in urine/dark urine, nocturia  Musculoskeletal: backache/back pain (new or increasing), weakness, joint pain/stiffness (new or increasing), muscle cramps, swelling of hands, feet, ankles, leg pain/redness  Skin: change in moles/freckles, rash, nodules  Hematologic/lymphatic: swollen lymph glands, abnormal bruising/bleeding  Endocrine: excessive thirst/urination, cold or heat intolerance  Neurologic/emotional: worrisome memory change, numbness/tingling, anxiety, mood swings      Current Scheduled Meds:  Outpatient Encounter Prescriptions as of 9/18/2018   Medication Sig Dispense Refill     alendronate (FOSAMAX) 70 MG tablet Take 1 tablet by mouth once a week.  3     b complex vitamins tablet Take 1 tablet by mouth daily.       calcium " "carbonate-vitamin D3 (CALCIUM 600 + D,3,) 600 mg(1,500mg) -400 unit per tablet Take 1 tablet by mouth daily.       LORazepam (ATIVAN) 0.5 MG tablet Take by mouth. 1-2 tabs orally once a day as needed  For anxiety       lutein 20 mg cap Take 1 capsule by mouth every other day.       MULTIVITAMIN ORAL Take 1 tablet by mouth daily.       omega-3/dha/epa/fish oil (FISH OIL-OMEGA-3 FATTY ACIDS) 300-1,000 mg capsule Take 2 g by mouth daily.       cetirizine (ZYRTEC) 10 MG tablet Take 1 tablet (10 mg total) by mouth daily. 30 tablet 2     triamcinolone (KENALOG) 0.1 % ointment Apply topically 2 (two) times a day. 30 g 0     No facility-administered encounter medications on file as of 9/18/2018.      Past Medical History:   Diagnosis Date     Breast cancer (H) 2006     Hx of radiation therapy      Osteoporosis      Past Surgical History:   Procedure Laterality Date     BREAST BIOPSY       BREAST LUMPECTOMY       Social History   Substance Use Topics     Smoking status: Current Every Day Smoker     Packs/day: 0.25     Years: 45.00     Smokeless tobacco: Never Used     Alcohol use 3.6 - 4.8 oz/week     6 - 8 Shots of liquor per week       Objective / Physical Examination:  Vitals:    09/18/18 1512   BP: 126/72   Pulse: 70   Weight: 125 lb (56.7 kg)   Height: 5' 3\" (1.6 m)     Wt Readings from Last 3 Encounters:   09/18/18 125 lb (56.7 kg)   05/07/18 124 lb (56.2 kg)     BP Readings from Last 3 Encounters:   09/18/18 126/72   05/07/18 98/64     Body mass index is 22.14 kg/(m^2).     General Appearance: Alert and oriented, cooperative, affect appropriate, speech clear, in no apparent distress  Head: Normocephalic, atraumatic  Ears: Tympanic membrane clear with landmarks well visualized bilaterally  Eyes: PERRL, fundi appear clear bilaterally. EOMI. Conjunctivae clear and sclerae non-icteric  Nose: Septum midline, nares patent, no visible polyps, mucosa moist and without drainage  Throat: Lips and mucosa moist. Teeth in good " repair, pharynx without erythema or exudate  Neck: Supple, trachea midline. No cervical adenopathy  Back: Symmetrical and nontender  Lungs: Clear to auscultation bilaterally. Normal inspiratory and expiratory effort  Cardiovascular: Regular rate, normal S1, S2. No murmurs, rubs, or gallops  Abdomen: Bowel sounds active all four quadrants. Soft, non-tender. No hepatomegaly or splenomegaly. No bruits detected.   Extremities: Pulses 2+ and equal throughout. No edema. Strength equal throughout.  Integumentary: Warm and dry. Without suspicious looking lesions, erythematous maculopapular rash on the neck and under the left eye, inflammed SK on the left side of the cheek near the TMJ region  Neuro: Alert and oriented, follows commands appropriately.     No orders of the defined types were placed in this encounter.  Followup: No Follow-up on file. earlier if needed.    Total time spent with patient was 25 min with >50% of time spent in face-to-face counseling regarding the above plan       Catarina Lopez MD

## 2021-06-20 NOTE — LETTER
Letter by Yossi Lloyd MD at      Author: Yossi Lloyd MD Service: -- Author Type: --    Filed:  Encounter Date: 9/11/2020 Status: (Other)         Nancy Phillips  [address]  [place] [address]             September 11, 2020         Dear Ms. Phillips,    Below are the results from your recent visit:    Resulted Orders   XR Chest 2 Views    Narrative    EXAM DATE:         09/10/2020    EXAM: X-RAY CHEST, 2 VIEWS, FRONTAL AND LATERAL  LOCATION: [facility]  DATE/TIME: 9/10/2020 3:15 PM    INDICATION: follow up CT chest of 9/4/20 showing small right hemopneumothorax  and multiple rib fractures after fall on 9/1/20  COMPARISON: CT 09/04/2020    IMPRESSION: The right seventh, eighth, ninth and 10th displaced rib fractures  are visualized. No right pneumothorax. Small amount right inferior pleural  fluid.    Heart size appears normal. Left lung appears clear.               The xray showed again the right 7th, 8th, and 10th displaced rib fractures, but no pneumothorax and only a small amount of pleural fluid on the right, which could represent residual blood from the initial injury.  I would consider this result to be reassuring.  Please advise her to be seen in follow up one more time with repeat chest xray in 4-6 weeks. Please send a [employer] message or call [phone] to schedule this appointment.      Please call with questions or contact us using [employer].    Sincerely,        Electronically signed by Yossi Lloyd MD

## 2021-06-21 ENCOUNTER — RECORDS - HEALTHEAST (OUTPATIENT)
Dept: ADMINISTRATIVE | Facility: OTHER | Age: 66
End: 2021-06-21

## 2021-06-21 NOTE — LETTER
Letter by Catarina Lopez MD at      Author: Catarina Lopez MD Service: -- Author Type: --    Filed:  Encounter Date: 10/19/2020 Status: (Other)        Nancy Phillips  [address]  [place] [address]             October 19, 2020         Dear Nancy Phillips,    Below are the results from Nancy's recent visit:    Resulted Orders   XR Shoulder Right 2 or More VWS    Narrative    EXAM DATE:         10/15/2020    EXAM: X-RAY SHOULDER RIGHT, MINIMUM 2 VIEWS  LOCATION: [facility]  DATE/TIME: 10/15/2020 11:45 AM    INDICATION: Multiple fractures of ribs, right side, initial encounter for closed  fracture Traumatic hemopneumothorax, initial encounter  COMPARISON: 9/4/2020 CT chest abdomen and pelvis. 10/15/2020 chest x-ray.    IMPRESSION: No acute fractures are evident. No humeral or scapular fractures are  evident. Normal glenohumeral alignment. The acromioclavicular joint is  unremarkable. Multiple subacute healing right rib fractures.                  Still some healing fractures.  No fracture of the clavicle seen.  Ok to return to work     Please call with questions or contact us using [employer].    Sincerely,        Electronically signed by Catarina Lopez MD

## 2021-06-24 ENCOUNTER — COMMUNICATION - HEALTHEAST (OUTPATIENT)
Dept: FAMILY MEDICINE | Facility: CLINIC | Age: 66
End: 2021-06-24

## 2021-06-25 NOTE — PROGRESS NOTES
Daljit Phillips,    Your result is/are stable.  Continue current medications if on any.  Call or my chart message me if any questions or concerns.    Catarina Lopez MD

## 2021-06-26 NOTE — PROGRESS NOTES
Daljit Phillips,    Your result is/are stable.  You still show osteoporosis but it looks like the fosamax is working and helping prevent worse outcomes so I recommend staying on that and then repeat the DEXA in 2 years.  Call or my chart message me if any questions or concerns.    Catarina Lopez MD

## 2021-07-04 NOTE — LETTER
Letter by Catarina Lopez MD at      Author: Catarina Lopez MD Service: -- Author Type: --    Filed:  Encounter Date: 6/1/2021 Status: (Other)        Nancy Phillips  [address] [place] [address]             June 1, 2021         Dear Nancyzee Phillips,    Below are the results from Nancy's recent visit:    Resulted Orders   Comprehensive Metabolic Panel   Result Value Ref Range    Sodium 140 136 - 145 mmol/L    Potassium 4.7 3.5 - 5.0 mmol/L    Chloride 102 98 - 107 mmol/L    CO2 25 22 - 31 mmol/L    Anion Gap, Calculation 13 5 - 18 mmol/L    Glucose 87 70 - 125 mg/dL    BUN 12 8 - 22 mg/dL    Creatinine 0.54 (L) 0.60 - 1.10 mg/dL    GFR MDRD Af Amer >60 >60 mL/min/1.73m2    GFR MDRD Non Af Amer >60 >60 mL/min/1.73m2    Bilirubin, Total 0.8 0.0 - 1.0 mg/dL    Calcium 9.4 8.5 - 10.5 mg/dL    Protein, Total 7.5 6.0 - 8.0 g/dL    Albumin 4.3 3.5 - 5.0 g/dL    Alkaline Phosphatase 93 45 - 120 U/L    AST 30 0 - 40 U/L    ALT 36 0 - 45 U/L    Narrative    Fasting Glucose reference range is 70-99 mg/dL per  American Diabetes Association (ADA) guidelines.   Lipid Cascade RANDOM   Result Value Ref Range    Cholesterol 240 (H) <=199 mg/dL    Triglycerides 78 <=149 mg/dL    HDL Cholesterol 123 >=50 mg/dL    LDL Calculated 101 <=129 mg/dL    Patient Fasting > 8hrs? Yes    Vitamin D, Total (25-Hydroxy)   Result Value Ref Range    Vitamin D, Total (25-Hydroxy) 41.2 30.0 - 80.0 ng/mL    Narrative    Deficiency <10.0 ng/mL  Insufficiency 10.0-29.9 ng/mL  Sufficiency 30.0-80.0 ng/mL  Toxicity (possible) >100.0 ng/mL   HM1 (CBC with Diff)   Result Value Ref Range    WBC 6.7 4.0 - 11.0 thou/uL    RBC 4.67 3.80 - 5.40 mill/uL    Hemoglobin 14.8 12.0 - 16.0 g/dL    Hematocrit 44.1 35.0 - 47.0 %    MCV 94 80 - 100 fL    MCH 31.7 27.0 - 34.0 pg    MCHC 33.6 32.0 - 36.0 g/dL    RDW 12.1 11.0 - 14.5 %    Platelets 221 140 - 440 thou/uL    MPV 8.8 7.0 - 10.0 fL    Neutrophils % 52 50 - 70 %    Lymphocytes % 35 20 - 40 %     Monocytes % 11 (H) 2 - 10 %    Eosinophils % 2 0 - 6 %    Basophils % 1 0 - 2 %    Immature Granulocyte % 0 <=0 %    Neutrophils Absolute 3.5 2.0 - 7.7 thou/uL    Lymphocytes Absolute 2.3 0.8 - 4.4 thou/uL    Monocytes Absolute 0.7 0.0 - 0.9 thou/uL    Eosinophils Absolute 0.1 0.0 - 0.4 thou/uL    Basophils Absolute 0.1 0.0 - 0.2 thou/uL    Immature Granulocyte Absolute 0.0 <=0.0 thou/uL     Daljit Phillips,     Your result is/are stable.  Continue current medications if on any.  Call or my chart message me if any questions or concerns.      Please call with questions or contact us using [facility].    Sincerely,        Electronically signed by Catarina Lopez MD

## 2021-07-04 NOTE — LETTER
Letter by Catarina Lopez MD at      Author: Catarina Lopez MD Service: -- Author Type: --    Filed:  Encounter Date: 5/26/2021 Status: (Other)       Non-Opioid Controlled Substance Agreement    This is an agreement between you and your provider regarding safe and appropriate controlled substance prescribing.? Controlled substances are?medicines that can cause physical and mental dependence. The manufacturing, possession and use of these medicines are regulated by law.  We here at [facility] are making a commitment to work with you in your efforts to get better.? To support you in this work, we will help you schedule regular appointments for medicine refills. If we must cancel or change your appointment for any reason, we will make sure you have enough medication to last until your next appointment.      As a Provider, I will:     Listen carefully to your concerns while treating you with dignity.     Recommend a treatment plan that I believe is in your best interest and may involve therapies other than medication.      Review the chance of benefit and the chance of harm of this medicine with you regularly and evaluate the safety and effectiveness of this therapy.       Provide a plan on how to discontinue if the decision is made to stop this medicine.       As a Patient, I understand controlled substances:       Are prescribed by my care provider to help me function or work and manage my condition(s).?    Are strong medicines and can cause serious side effects.      Need to be taken exactly as prescribed.?Combining controlled substances with certain medicines or chemicals (such as illegal drugs, alcohol, sedatives, sleeping pills, and benzodiazepines) can be dangerous or even fatal.? If I stop taking my medicines suddenly, I may have severe withdrawal symptoms.     The risks, benefits, and side effects of these medicine(s) were explained to me. I agree that:    1. I will take part in other treatments as  advised by my care team. This may be psychiatry or counseling, physical therapy, behavioral therapy, group treatment or a referral to specialist.    2. I will keep all my appointments and understand this is part of the monitoring of controlled substance.?My care team may require an office visit for EVERY controlled substance refill. If I miss appointments or dont follow instructions, my care team may stop my medicine    3. I will take my medicines as prescribed. I will not change the dose or schedule unless my care team tells me to. There will be no refills if I run out early.      4. I may be contactedwithout warning and asked to complete a urine drug test or pill count at any time. If I dont give a urine sample or participate in a pill count, the care team may stop my medicine.    5. I will only receive controlled substance prescriptions from this clinic. If treated by another provider, I will let them know I am taking controlled substances and that I have a treatment agreement with this provider. I will inform this care team within one business day if I am given a prescription for any controlled substance by another healthcare provider. This care team may contact other providers and pharmacists about my use of the medicines.     6. It is up to me to make sure that I do not run out of my medicines on weekends or holidays.?If my care team is willing to refill my prescription without a visit, I must request refills only during office hours. Refills may take up to 3 business days to process.  I will use one pharmacy to fill all my controlled substance prescriptions.  I will notify the clinic if any changes are made due to insurance changes or medication availability.    7. I am responsible for my prescriptions. If the medicine/prescription is lost, stolen or destroyed, it will not be replaced.?I also agree not to share controlled substance medicines with anyone.     8. I am aware I should not use any illegal or  recreational drugs. I agree not to drink alcohol unless my care team says I can.     9. If I enroll in the [place] Medical Cannabis program, I will tell my care team.?    10. I will tell my care team right away if I become pregnant, have a new medical problem treated outside of my regular clinic, or have a change in my medications.     11. I understand that this medicine can affect my thinking, judgment and reaction time.? Alcohol and drugs affect the brain and body, which can affect the safety of a persons driving. Being under the influence of alcohol or drugs can affect a persons decision-making, behaviors, personal safety, and the safety of others. Driving while impaired (DWI) can occur if a person is driving, operating, or in physical control of a car, motorcycle, boat, snowmobile, ATV, motorbike, off-road vehicle, or any other motor vehicle ([place] Statute 169A.20). I understand the risk if I choose to drive or operate machinery  I understand that if I do not follow any of the conditions above, my prescriptions or treatment may be stopped or changed.     I agree that my provider, clinic care team, and pharmacy may work with any city, state or federal law enforcement agency that investigates the misuse, sale, or other diversion of my controlled medicine. I will allow my provider to discuss my care with or share a copy of this agreement with any other treating provider, pharmacy or emergency room where I receive care.?    I have read this agreement and have asked questions about anything I did not understand.    ________________________________________________________  Patient Signature - Nancy Phillips     ___________________                   Date     ________________________________________________________  Provider Signature - Catarina Lopez MD       ___________________                   Date     ________________________________________________________  Witness Signature (required if provider not present  while patient signing)          ___________________                   Date

## 2021-07-04 NOTE — LETTER
Letter by Catarina Lopez MD at      Author: Catarina Lopez MD Service: -- Author Type: --    Filed:  Encounter Date: 6/24/2021 Status: (Other)         Nancy Phillips  [address] [place] [address]             June 24, 2021         Dear Ms. Phillips,    Below are the results from your recent visit:    Resulted Orders   DXA Bone Density Scan    Narrative    6/18/2021      RE: Nancy Phillips  YOB: 1955        Dear Catarina Lopez,    Patient Profile:  65 y.o. female, postmenopausal, is here for the follow up bone density   test.   History of fractures - Yes;  Ribs. Family history of osteoporosis - None.    Family history of hip fracture: None. Smoking history - Current.   Osteoporosis treatment past -  No. Osteoporosis treatment current - Yes;    Bisphosphonates.  Chronic medical problems - Breast cancer and Radiation   treatment. High risk medications -  None.    Assessment:    1. The spine bone density is assessed using L1-L4 with T-score of -2.5..  2. Femoral bone densities show left femoral neck T- score of -2.1, and   right total hip T-score of -1.8..  3.  Since the scan in 2019, bone density has not significantly changed in   the AP spine or either total hip.  4.  The trabecular bone score suggests poor micro architecture.    65 y.o. female with OSTEOPOROSIS and HIGH predicted future fracture risk.         Recommendations:  Stability or increase in bone density is regarded as a positive response   to an antiresorptive agent.  Therefore, continuation of alendronate is   reasonable with a recheck in 2 years.      Bone densitometry was performed on your patient using our Indicative Software   densitometer. The results are summarized and a copy of the actual scans   are included for your review. In conformity with the International Society   of Clinical Densitometry's most recent position statement for DXA   interpretation (2015), the diagnosis will be made on the lowest measured   T-score  of the lumbar spine, femoral neck, total proximal femur or 33%   radius. Note the change in terminology for diagnostic classification from   OSTEOPENIA to LOW BONE MASS. All trending for sequential exams will be   done using multiple vertebrae or the total proximal femur. Fracture risk   is based on the WHO Fracture Risk Assessment Tool (FRAX). If additional   information is needed or if you would like to discuss the results, please   do not hesitate to call me.       Thank you for referring this patient to [facility]. We are happy to be of service in support of you and your   practice. If you have any questions or suggestions to improve our service,   please call me at [phone].     Sincerely,     Patricio Pak M.D. CJuanCVIK.  [facility]       Daljit Phillips,     Your result is/are stable.  You still show osteoporosis but it looks like the fosamax is working and helping prevent worse outcomes so I recommend staying on that and then repeat the DEXA in 2 years.  Call or my chart message me if any questions or concerns.    Please call with questions or contact us using [employer].    Sincerely,        Electronically signed by Catarina Lopez MD

## 2021-07-04 NOTE — PROGRESS NOTES
Progress Notes by Catarina Lopez MD at 5/26/2021  9:55 AM     Author: Catarina Lopez MD Service: -- Author Type: Physician    Filed: 5/26/2021 12:53 PM Encounter Date: 5/26/2021 Status: Signed    : Catarina Lopez MD (Physician)       FEMALE PREVENTATIVE EXAM    Assessment and Plan:     Patient has been advised of split billing requirements and indicates understanding: Yes    1. Routine general medical examination at a health care facility  Routine screening recommended.  Mammogram due in 8/21, colonoscopy up to date  - Comprehensive Metabolic Panel  - HM1(CBC and Differential)  - Lipid Cascade RANDOM  - Vitamin D, Total (25-Hydroxy)    2. Anxiety  Using infrequently and CSA signed.    - LORazepam (ATIVAN) 0.5 MG tablet; 1-2 tabs orally once a day as needed  For anxiety  Dispense: 60 tablet; Refill: 0    3. Encounter for tobacco use cessation counseling  Recommend smoking cessation.  Discussed CT and pt declined at this time    4. Age-related osteoporosis without current pathological fracture  Will do labs and DXA and determine if should continue with the fosamax  - DXA Bone Density Scan; Future  - Vitamin D, Total (25-Hydroxy)  - DXA Bone Density Scan    5. Dermatitis  Ongoing - TATY signed for dermatology.  ?allergy component and pt would like referral  - Ambulatory referral to Allergy - [place]    6. Visit for screening mammogram  History of breast cancer - right lumpectomy  - Mammo Screening Bilateral; Future    7.  History of rib fractures  8. Right sided thoracic pain  - offered XR and PT and patient would like to wait at this time.  Reviewed ER note    Next follow up:  No follow-ups on file.    Immunization Review  Adult Imm Review: No immunizations due today  Documented tobacco use.  Website and phone contact for [employer] given to patient in AVS.    I discussed the following with the patient:   Adult Healthy Living: Importance of regular exercise  Healthy nutrition  Getting adequate  sleep  Stress management  Use of seat belts    I have had an Advance Directives discussion with the patient.    Subjective:   Chief Complaint: Nancy Phillips is an 65 y.o. female here for a preventative health visit.    Patient has been advised of split billing requirements and indicates understanding: Yes  HPI:  In the fall, fell off back step.  Went to the ER:  1.  Small right hemopneumothorax. 2.  Multiple mild and moderately displaced right rib fractures. Ribs 8, 9, and 10 are fractured in multiple locations which raises question of flail chest.. The right posterior 10th rib is moderately comminuted. 3.  Mildly displaced transverse process fractures of L2, L3, and L4. 4.  No traumatic injuries to the abdominal viscera. Critical Result: Pneumothorax Finding was identified on 9/4/2020 at 7:45 PM. Dr. De Santiago in [facility] emergency department was contacted by me on 9/4/2020 7:49 PM and verbalized understanding of the critical result.     Feels like thre is a bulge on the right side still.  Full ROM still.      Broke out in hives when doing some gardening.  Bullous  Seen by dermatology.  Hive like reaction since Easter.  On collagen peptide and thought that was causing the blisters and stopped that.  Dermatology did scraping of the back hive like lesions - seen by dermatology consultants and told dermatitis like lesion.    Seldom taking lorazepam.    Currently on fosamax - our records state 2018.  Was off it for a period of time.    Still smoking.  Declined CT at this time  Healthy Habits  Are you taking a daily aspirin? No  Do you typically exercising at least 40 min, 3-4 times per week?  Yes  Do you usually eat at least 4 servings of fruit and vegetables a day, include whole grains and fiber and avoid regularly eating high fat foods? Yes  Have you had an eye exam in the past two years? Yes  Do you see a dentist twice per year? Yes  Do you have any concerns regarding sleep? No    Safety Screen  If you own  "firearms, are they secured in a locked gun cabinet or with trigger locks? The patient does not own any firearms  Do you feel you are safe where you are living?: Yes (5/26/2021 10:07 AM)  Do you feel you are safe in your relationship(s)?: Yes (5/26/2021 10:07 AM)      Review of Systems:  Please see above.  The rest of the review of systems are negative for all systems.     Pap History:   Last 3 PAP results:  No results found for: PAP  Cancer Screening       Status Date      PAP SMEAR Next Due 4/20/2022      Done 4/20/2017 GYNECOLOGIC CYTOLOGY (PAP SMEAR)    MAMMOGRAM Next Due 8/3/2022      Done 8/3/2020 MAMMOGRAM - HIM SCAN     Patient has more history with this topic...          Patient Care Team:  Catarina Lopez MD as PCP - General (Family Medicine)  Catarina Lopez MD as Assigned PCP        History     Reviewed By Date/Time Sections Reviewed    Catarina Reed CMA 5/26/2021 10:08 AM Tobacco            Objective:   Vital Signs:   Visit Vitals  /62 (Patient Site: Right Arm, Patient Position: Sitting, Cuff Size: Adult Regular)   Pulse 74   Ht 5' 2\" (1.575 m)   Wt 121 lb 3.2 oz (55 kg)   BMI 22.17 kg/m           PHYSICAL EXAM    Physical Exam:  General Appearance: Alert, cooperative, no distress, appears stated age   Head: Normocephalic, without obvious abnormality, atraumatic  Eyes: PERRL, conjunctiva/corneas clear, EOM's intact   Ears: Normal TM's and external ear canals, both ears  Nose:Nares normal, septum midline,mucosa normal, no drainage    Throat:Lips, mucosa, and tongue normal; teeth and gums normal  Neck: Supple, symmetrical, trachea midline, no adenopathy;  thyroid: not enlarged, symmetric, no tenderness/mass/nodules  Back: Symmetric, no curvature, ROM normal, small soft lump over the right scapula, full ROM  Lungs: Clear to auscultation bilaterally, respirations unlabored  Heart: Regular rate and rhythm, S1 and S2 normal, no murmur, rub, or gallop  Abdomen: Soft, non-tender, bowel sounds active " all four quadrants,  no masses, no organomegaly  Extremities: Extremities normal, atraumatic, no cyanosis or edema  Skin: Skin color, texture, turgor normal, no rashes or lesions  Lymph nodes: Cervical, supraclavicular, and axillary nodes normal  Neurologic: Normal            The ASCVD Risk score (Vika CINDY Coronado., et al., 2013) failed to calculate for the following reasons:    Cannot find a previous HDL lab    Cannot find a previous total cholesterol lab         Medication List          Accurate as of May 26, 2021 12:53 PM. If you have any questions, ask your nurse or doctor.            CONTINUE taking these medications    alendronate 70 MG tablet  Also known as: FOSAMAX  INSTRUCTIONS: TAKE 1 TABLET BY MOUTH ONCE WEEKLY ON EMPTY STOMACH WITH WATER AS DIRECTED        b complex vitamins tablet  INSTRUCTIONS: Take 1 tablet by mouth daily.        betamethasone dipropionate 0.05 % ointment  Also known as: DIPROLENE        Calcium 600 + D(3) 600 mg(1,500mg) -400 unit per tablet  INSTRUCTIONS: Take 1 tablet by mouth daily.  Generic drug: calcium carbonate-vitamin D3        fish oil-omega-3 fatty acids 300-1,000 mg capsule  INSTRUCTIONS: Take 2 g by mouth daily.        LORazepam 0.5 MG tablet  Also known as: ATIVAN  INSTRUCTIONS: 1-2 tabs orally once a day as needed  For anxiety        lutein 20 mg Cap  INSTRUCTIONS: Take 1 capsule by mouth every other day.        MULTIVITAMIN ORAL  INSTRUCTIONS: Take 1 tablet by mouth daily.           STOP taking these medications    ketoconazole 2 % cream  Also known as: NIZORAL  Stopped by: Catarina Lopez MD     triamcinolone 0.1 % ointment  Also known as: KENALOG  Stopped by: Catarina Lopez MD           Where to Get Your Medications      These medications [id number] [place] [id number] AT [address] [address]    Phone: [phone]     LORazepam 0.5 MG tablet         Additional Screenings  Completed Today:

## 2021-07-06 VITALS
DIASTOLIC BLOOD PRESSURE: 62 MMHG | HEART RATE: 74 BPM | BODY MASS INDEX: 22.31 KG/M2 | SYSTOLIC BLOOD PRESSURE: 100 MMHG | WEIGHT: 121.2 LBS | HEIGHT: 62 IN

## 2021-07-13 ENCOUNTER — RECORDS - HEALTHEAST (OUTPATIENT)
Dept: ADMINISTRATIVE | Facility: CLINIC | Age: 66
End: 2021-07-13

## 2021-07-21 ENCOUNTER — RECORDS - HEALTHEAST (OUTPATIENT)
Dept: ADMINISTRATIVE | Facility: CLINIC | Age: 66
End: 2021-07-21

## 2021-07-22 ENCOUNTER — RECORDS - HEALTHEAST (OUTPATIENT)
Dept: SCHEDULING | Facility: CLINIC | Age: 66
End: 2021-07-22

## 2021-07-22 DIAGNOSIS — Z12.31 OTHER SCREENING MAMMOGRAM: ICD-10-CM

## 2021-08-03 ENCOUNTER — OFFICE VISIT (OUTPATIENT)
Dept: ALLERGY | Facility: CLINIC | Age: 66
End: 2021-08-03
Payer: COMMERCIAL

## 2021-08-03 VITALS — HEIGHT: 62 IN | BODY MASS INDEX: 22.26 KG/M2 | OXYGEN SATURATION: 96 % | WEIGHT: 121 LBS | HEART RATE: 84 BPM

## 2021-08-03 DIAGNOSIS — T78.40XD ALLERGIC REACTION, SUBSEQUENT ENCOUNTER: ICD-10-CM

## 2021-08-03 DIAGNOSIS — L50.3 DERMATOGRAPHIA: ICD-10-CM

## 2021-08-03 DIAGNOSIS — R21 RASH AND NONSPECIFIC SKIN ERUPTION: Primary | ICD-10-CM

## 2021-08-03 PROCEDURE — 95004 PERQ TESTS W/ALRGNC XTRCS: CPT | Performed by: ALLERGY & IMMUNOLOGY

## 2021-08-03 PROCEDURE — 99243 OFF/OP CNSLTJ NEW/EST LOW 30: CPT | Mod: 25 | Performed by: ALLERGY & IMMUNOLOGY

## 2021-08-03 ASSESSMENT — MIFFLIN-ST. JEOR: SCORE: 1057.1

## 2021-08-03 NOTE — PROGRESS NOTES
Subjective       HPI chief complaint: Skin reaction    History of present illness: This is a pleasant 65-year-old woman I was asked to see for evaluation by Dr. Lopez in regards to a skin rash.  Patient reports in April of this year she was working outside in her yard.  She was wearing gardening gloves.  She then notes she developed a rash on her hands, back and bottoms of her feet.  It was a blistering skin rash.  She has pictures today that show large blisters on the palms of her hands and on the bottoms of her feet.  On her back, she has red plaques.  She states it took 1 to 2 months but it did improve.  She states she saw dermatology during this but I do not have this record to review.  She is unclear as to what dermatology stated regarding the rash and she is not sure if there was a biopsy taken.  She denies any new exposures.  She states she was taking a collagen supplement at that time for about a week.  She stopped taking it.  She states she had no other over-the-counter medications except her current supplements which she continues to take.  She does not think she has a history of seasonal allergies but states this happened back in 2014 during the spring as well.  She states at that time she had petechiae that showed up.  It lasted for about a month and a half at that time and was itchy.  She is not clear for work-up at that time.  She is never had allergy testing.  She denies any chemicals on her skin.  No fertilizers or other long chemicals that she was using.  She states that she cannot think of anything that was different at home in terms of exposures.  She states she was using cloth gloves.  She states she was wearing shoes and her back was covered as well.    Past medical history: History of breast cancer, osteoporosis    Social history: She does have 2 cats at home, she is a smoker, works as a PCA    Family history: Negative for asthma and allergies        Review of Systems   Constitutional,  "HEENT, cardiovascular, pulmonary, GI, , musculoskeletal, neuro, skin, endocrine and psych systems are negative, except as otherwise noted.      Objective    Pulse 84   Ht 1.575 m (5' 2\")   Wt 54.9 kg (121 lb)   SpO2 96%   BMI 22.13 kg/m    Body mass index is 22.13 kg/m .  Physical Exam      Gen: Pleasant female not in acute distress  HEENT: Eyes no erythema of the bulbar or palpebral conjunctiva, no edema. Ears: No deformities or lesions nose: No congestion, Mouth: Throat clear,   Neck: No masses lesions or swelling  Respiratory: No coughing with breathing, no retractions  Lymph: No visible supraclavicular or cervical lymphadenopathy  Skin: No rashes or lesions  Psych: Alert and oriented times 3    30 percutaneous test were placed to the environmental skin test panel, patient was dermatographic    Impression report and plan:  1.  Skin rash   2.  Dermatographia    Unfortunately, I was unable to interpret her testing today as she was dermatographic.  I offered her specific IgE testing but she would like to wait on this for now.  I would like to obtain her records from dermatology.  I will contact the patient once I have these records.  If the rash returns, could consider biopsy if this has not been done.  Could also consider patch testing.  I did state if this is a noxious weed that she was exposed to, however, we do not have testing for these sorts of exposures.  Follow as needed.          "

## 2021-08-03 NOTE — PATIENT INSTRUCTIONS
Dermatographia    Check blood testing for environmental    Can consider patch testing    If rash returns, would consider biopsy as well    True test

## 2021-08-03 NOTE — LETTER
8/3/2021         RE: Nancy Phillips  [address] [place] [address]        Dear Colleague,    Thank you for referring your patient, Nancy Phillips, to the [facility]. Please see a copy of my visit note below.          Subjective       HPI chief complaint: Skin reaction    History of present illness: This is a pleasant 65-year-old woman I was asked to see for evaluation by Dr. Lopez in regards to a skin rash.  Patient reports in April of this year she was working outside in her yard.  She was wearing gardening gloves.  She then notes she developed a rash on her hands, back and bottoms of her feet.  It was a blistering skin rash.  She has pictures today that show large blisters on the palms of her hands and on the bottoms of her feet.  On her back, she has red plaques.  She states it took 1 to 2 months but it did improve.  She states she saw dermatology during this but I do not have this record to review.  She is unclear as to what dermatology stated regarding the rash and she is not sure if there was a biopsy taken.  She denies any new exposures.  She states she was taking a collagen supplement at that time for about a week.  She stopped taking it.  She states she had no other over-the-counter medications except her current supplements which she continues to take.  She does not think she has a history of seasonal allergies but states this happened back in 2014 during the spring as well.  She states at that time she had petechiae that showed up.  It lasted for about a month and a half at that time and was itchy.  She is not clear for work-up at that time.  She is never had allergy testing.  She denies any chemicals on her skin.  No fertilizers or other long chemicals that she was using.  She states that she cannot think of anything that was different at home in terms of exposures.  She states she was using cloth gloves.  She states she was wearing shoes and her back was  "covered as well.    Past medical history: History of breast cancer, osteoporosis    Social history: She does have 2 cats at home, she is a smoker, works as a PCA    Family history: Negative for asthma and allergies        Review of Systems   Constitutional, HEENT, cardiovascular, pulmonary, GI, , musculoskeletal, neuro, skin, endocrine and psych systems are negative, except as otherwise noted.      Objective    Pulse 84   Ht 1.575 m (5' 2\")   Wt 54.9 kg (121 lb)   SpO2 96%   BMI 22.13 kg/m    Body mass index is 22.13 kg/m .  Physical Exam      Gen: Pleasant female not in acute distress  HEENT: Eyes no erythema of the bulbar or palpebral conjunctiva, no edema. Ears: No deformities or lesions nose: No congestion, Mouth: Throat clear,   Neck: No masses lesions or swelling  Respiratory: No coughing with breathing, no retractions  Lymph: No visible supraclavicular or cervical lymphadenopathy  Skin: No rashes or lesions  Psych: Alert and oriented times 3    30 percutaneous test were placed to the environmental skin test panel, patient was dermatographic    Impression report and plan:  1.  Skin rash   2.  Dermatographia    Unfortunately, I was unable to interpret her testing today as she was dermatographic.  I offered her specific IgE testing but she would like to wait on this for now.  I would like to obtain her records from dermatology.  I will contact the patient once I have these records.  If the rash returns, could consider biopsy if this has not been done.  Could also consider patch testing.  I did state if this is a noxious weed that she was exposed to, however, we do not have testing for these sorts of exposures.  Follow as needed.              Again, thank you for allowing me to participate in the care of your patient.        Sincerely,        Angela RUIZ MD    "

## 2021-10-10 ENCOUNTER — HEALTH MAINTENANCE LETTER (OUTPATIENT)
Age: 66
End: 2021-10-10

## 2022-03-25 DIAGNOSIS — M81.0 AGE-RELATED OSTEOPOROSIS WITHOUT CURRENT PATHOLOGICAL FRACTURE: ICD-10-CM

## 2022-03-25 RX ORDER — ALENDRONATE SODIUM 70 MG/1
TABLET ORAL
Qty: 12 TABLET | Refills: 3 | Status: CANCELLED | OUTPATIENT
Start: 2022-03-25

## 2022-06-09 ENCOUNTER — OFFICE VISIT (OUTPATIENT)
Dept: FAMILY MEDICINE | Facility: CLINIC | Age: 67
End: 2022-06-09
Payer: COMMERCIAL

## 2022-06-09 VITALS
HEART RATE: 65 BPM | DIASTOLIC BLOOD PRESSURE: 70 MMHG | OXYGEN SATURATION: 97 % | SYSTOLIC BLOOD PRESSURE: 110 MMHG | WEIGHT: 122 LBS | BODY MASS INDEX: 22.31 KG/M2

## 2022-06-09 DIAGNOSIS — H69.93 DYSFUNCTION OF BOTH EUSTACHIAN TUBES: Primary | ICD-10-CM

## 2022-06-09 DIAGNOSIS — M81.0 AGE-RELATED OSTEOPOROSIS WITHOUT CURRENT PATHOLOGICAL FRACTURE: ICD-10-CM

## 2022-06-09 PROCEDURE — 99213 OFFICE O/P EST LOW 20 MIN: CPT | Performed by: FAMILY MEDICINE

## 2022-06-09 RX ORDER — FLUTICASONE PROPIONATE 50 MCG
1-2 SPRAY, SUSPENSION (ML) NASAL DAILY
COMMUNITY
End: 2022-08-01

## 2022-06-09 RX ORDER — CETIRIZINE HYDROCHLORIDE 10 MG/1
10 TABLET ORAL DAILY PRN
COMMUNITY
End: 2022-08-01

## 2022-06-09 RX ORDER — ALENDRONATE SODIUM 70 MG/1
TABLET ORAL
Qty: 12 TABLET | Refills: 3 | Status: SHIPPED | OUTPATIENT
Start: 2022-06-09 | End: 2023-03-01

## 2022-06-09 NOTE — PATIENT INSTRUCTIONS
Trial of Flonase x2 weeks.  Stop the Flonase if nasal bleeding occurs.  Zyrtec as needed for nasal congestion or allergies.  Follow-up with primary care or ENT if worsening symptoms or not improving over the next 2 weeks.  Follow-up in the ER immediately if chest pain >5 minutes, breathing concerns, or other severe/emergent symptoms.

## 2022-06-09 NOTE — PROGRESS NOTES
Assessment & Plan     Nancy was seen today for otalgia.    Diagnoses and all orders for this visit:    Dysfunction of both eustachian tubes.  No evidence of otitis media or otitis externa.    Transient shortness of breath x2 minutes this morning, resolved.    Discussed risks and benefits of treatment strategies.    Patient declines a prescription for Flonase.    Patient declines a further work-up of her shortness of breath, which has not recurred.    Patient Instructions     Trial of Flonase x2 weeks.    Stop the Flonase if nasal bleeding occurs.    Zyrtec as needed for nasal congestion or allergies.    Follow-up with primary care or ENT if worsening symptoms or not improving over the next 2 weeks.    Follow-up in the ER immediately if chest pain >5 minutes, breathing concerns, or other severe/emergent symptoms.    Return in about 2 weeks (around 6/23/2022), or if symptoms worsen or fail to improve.    Pertona Toribio MD  [facility]   Nancy is a 66 year old female, who presents to clinic today for the following health issues:  Chief Complaint   Patient presents with     Otalgia     Both, mostly right     History of Present Illness       Reason for visit:  Ears feel plugged, mainly right  Symptom onset:  3-4 weeks ago  Symptoms include:  Just feels plugged  Symptom intensity:  Mild  Symptom progression:  Staying the same  Had these symptoms before:  No  What makes it worse:  No  What makes it better:  No    She eats 2-3 servings of fruits and vegetables daily.She consumes 1 sweetened beverage(s) daily.She exercises with enough effort to increase her heart rate 9 or less minutes per day.  She exercises with enough effort to increase her heart rate 3 or less days per week.   She is taking medications regularly.    HPI:  Ear Concern    The patient is a 66-year-old female, with history of a plugged sensation involving her right >left ear, starting 3-4 weeks  "ago.  Patient denies fever, chills, nausea, vomiting, decreased hearing, ear drainage, tinnitus, vertigo, headache, or facial pressure.  Patient has not tried any treatments.  She denies having a history of allergies or ceruminosis.    Patient denies significant nasal congestion or postnasal drainage, but she has had some chest \"congestion\" this morning.  No significant cough.  Patient felt mildly short of breath for ~2 minutes this morning, but she denies feeling short of breath since that time.  No chest pain, chest pressure, GERD, hemoptysis, wheezing, lower extremity edema, or orthopnea.    Review of Systems      Objective    /70   Pulse 76   Wt 55.3 kg (122 lb)   BMI 22.31 kg/m    Physical Exam   GENERAL APPEARANCE:  Awake, alert, and in no acute distress.  PSYCHIATRIC:  Pleasant, smiling affect.  HEENT:  Sclera anicteric.  No conjunctivitis.  PERRLA.  Extraocular movements are intact.  Bilateral TM's and canals are within normal limits.  Good cone of light reflex bilaterally.  Not tender with palpation of the tragus and with retraction of the pinna bilaterally.  Mild nasal congestion.  Sinuses are not tender to palpation.  No erythema, edema, or exudates of the oral mucosa or posterior pharynx.  Mucous membranes moist.  NECK:  Spontaneous full range of motion.    HEART:  Normal S1, S2.  Regular rate and rhythm.  No murmurs, rubs, or gallops.  LUNGS: Talking in complete sentences.  No respiratory distress.  No wheezes, rales, or rhonchi.  ABDOMEN:  Not distended.   EXTREMITIES:  Moves 4 extremities.   No edema.  NEUROLOGIC:  Gait within normal limits.  No facial droop or acute neurologic deficits.  SKIN:  No rash or diaphoresis.    EKG and Chest X-ray:  Discussed with and declined by patient.  "

## 2022-07-16 ENCOUNTER — HEALTH MAINTENANCE LETTER (OUTPATIENT)
Age: 67
End: 2022-07-16

## 2022-07-31 ASSESSMENT — ENCOUNTER SYMPTOMS
MYALGIAS: 0
HEARTBURN: 0
JOINT SWELLING: 0
SORE THROAT: 0
FEVER: 0
ARTHRALGIAS: 0
NAUSEA: 0
WEAKNESS: 0
PARESTHESIAS: 0
CONSTIPATION: 0
ABDOMINAL PAIN: 0
FREQUENCY: 0
HEMATURIA: 0
BREAST MASS: 0
HEMATOCHEZIA: 0
DIZZINESS: 0
DYSURIA: 0
COUGH: 0
EYE PAIN: 0
NERVOUS/ANXIOUS: 0
CHILLS: 0
DIARRHEA: 0
PALPITATIONS: 0
SHORTNESS OF BREATH: 0
HEADACHES: 0

## 2022-08-01 ENCOUNTER — OFFICE VISIT (OUTPATIENT)
Dept: FAMILY MEDICINE | Facility: CLINIC | Age: 67
End: 2022-08-01
Payer: COMMERCIAL

## 2022-08-01 VITALS
OXYGEN SATURATION: 96 % | DIASTOLIC BLOOD PRESSURE: 64 MMHG | WEIGHT: 121.7 LBS | BODY MASS INDEX: 22.98 KG/M2 | TEMPERATURE: 98.1 F | HEART RATE: 67 BPM | HEIGHT: 61 IN | SYSTOLIC BLOOD PRESSURE: 124 MMHG

## 2022-08-01 DIAGNOSIS — Z12.31 ENCOUNTER FOR SCREENING MAMMOGRAM FOR MALIGNANT NEOPLASM OF BREAST: ICD-10-CM

## 2022-08-01 DIAGNOSIS — H69.91 DYSFUNCTION OF RIGHT EUSTACHIAN TUBE: ICD-10-CM

## 2022-08-01 DIAGNOSIS — Z12.31 OTHER SCREENING MAMMOGRAM: ICD-10-CM

## 2022-08-01 DIAGNOSIS — F17.200 NICOTINE DEPENDENCE, UNCOMPLICATED, UNSPECIFIED NICOTINE PRODUCT TYPE: ICD-10-CM

## 2022-08-01 DIAGNOSIS — Z78.9 ALCOHOL USE: ICD-10-CM

## 2022-08-01 DIAGNOSIS — M81.0 AGE-RELATED OSTEOPOROSIS WITHOUT CURRENT PATHOLOGICAL FRACTURE: ICD-10-CM

## 2022-08-01 DIAGNOSIS — Z12.4 CERVICAL CANCER SCREENING: ICD-10-CM

## 2022-08-01 DIAGNOSIS — Z78.0 ASYMPTOMATIC MENOPAUSAL STATE: ICD-10-CM

## 2022-08-01 DIAGNOSIS — Z12.31 VISIT FOR SCREENING MAMMOGRAM: ICD-10-CM

## 2022-08-01 DIAGNOSIS — T78.40XD ALLERGIC REACTION, SUBSEQUENT ENCOUNTER: ICD-10-CM

## 2022-08-01 DIAGNOSIS — Z85.3 PERSONAL HISTORY OF BREAST CANCER: ICD-10-CM

## 2022-08-01 DIAGNOSIS — Z23 ENCOUNTER FOR IMMUNIZATION: ICD-10-CM

## 2022-08-01 DIAGNOSIS — Z00.00 ENCOUNTER FOR MEDICARE ANNUAL WELLNESS EXAM: Primary | ICD-10-CM

## 2022-08-01 DIAGNOSIS — Z87.891 PERSONAL HISTORY OF NICOTINE DEPENDENCE: ICD-10-CM

## 2022-08-01 PROBLEM — F10.90 ALCOHOL USE: Status: ACTIVE | Noted: 2022-08-01

## 2022-08-01 LAB
A ALTERNATA IGE QN: <0.1 KU(A)/L
A FUMIGATUS IGE QN: <0.1 KU(A)/L
ALBUMIN SERPL BCG-MCNC: 4.6 G/DL (ref 3.5–5.2)
ALP SERPL-CCNC: 75 U/L (ref 35–104)
ALT SERPL W P-5'-P-CCNC: 26 U/L (ref 10–35)
ANION GAP SERPL CALCULATED.3IONS-SCNC: 11 MMOL/L (ref 7–15)
AST SERPL W P-5'-P-CCNC: 31 U/L (ref 10–35)
BERMUDA GRASS IGE QN: <0.1 KU(A)/L
BILIRUB SERPL-MCNC: 0.4 MG/DL
BUN SERPL-MCNC: 12.5 MG/DL (ref 8–23)
C HERBARUM IGE QN: <0.1 KU(A)/L
CALCIUM SERPL-MCNC: 9.3 MG/DL (ref 8.8–10.2)
CAT DANDER IGG QN: <0.1 KU(A)/L
CEDAR IGE QN: <0.1 KU(A)/L
CHLORIDE SERPL-SCNC: 105 MMOL/L (ref 98–107)
CHOLEST SERPL-MCNC: 229 MG/DL
COMMON RAGWEED IGE QN: <0.1 KU(A)/L
COTTONWOOD IGE QN: <0.1 KU(A)/L
CREAT SERPL-MCNC: 0.58 MG/DL (ref 0.51–0.95)
D FARINAE IGE QN: <0.1 KU(A)/L
D PTERONYSS IGE QN: <0.1 KU(A)/L
DEPRECATED HCO3 PLAS-SCNC: 27 MMOL/L (ref 22–29)
DOG DANDER+EPITH IGE QN: <0.1 KU(A)/L
GFR SERPL CREATININE-BSD FRML MDRD: >90 ML/MIN/1.73M2
GLUCOSE SERPL-MCNC: 86 MG/DL (ref 70–99)
HDLC SERPL-MCNC: 131 MG/DL
HGB BLD-MCNC: 14.4 G/DL (ref 11.7–15.7)
IGE SERPL-ACNC: 147 KU/L (ref 0–114)
LDLC SERPL CALC-MCNC: 81 MG/DL
MAGNESIUM SERPL-MCNC: 2 MG/DL (ref 1.7–2.3)
MAPLE IGE QN: <0.1 KU(A)/L
MARSH ELDER IGE QN: <0.1 KU(A)/L
MOUSE URINE PROT IGE QN: <0.1 KU(A)/L
NETTLE IGE QN: <0.1 KU(A)/L
NONHDLC SERPL-MCNC: 98 MG/DL
P NOTATUM IGE QN: <0.1 KU(A)/L
POTASSIUM SERPL-SCNC: 4.9 MMOL/L (ref 3.4–5.3)
PROT SERPL-MCNC: 7.4 G/DL (ref 6.4–8.3)
ROACH IGE QN: <0.1 KU(A)/L
SALTWORT IGE QN: <0.1 KU(A)/L
SILVER BIRCH IGE QN: <0.1 KU(A)/L
SODIUM SERPL-SCNC: 143 MMOL/L (ref 136–145)
TIMOTHY IGE QN: <0.1 KU(A)/L
TRIGL SERPL-MCNC: 85 MG/DL
WHITE ASH IGE QN: <0.1 KU(A)/L
WHITE ELM IGE QN: <0.1 KU(A)/L
WHITE MULBERRY IGE QN: <0.1 KU(A)/L
WHITE OAK IGE QN: <0.1 KU(A)/L

## 2022-08-01 PROCEDURE — 36415 COLL VENOUS BLD VENIPUNCTURE: CPT | Performed by: STUDENT IN AN ORGANIZED HEALTH CARE EDUCATION/TRAINING PROGRAM

## 2022-08-01 PROCEDURE — 99213 OFFICE O/P EST LOW 20 MIN: CPT | Mod: 25 | Performed by: STUDENT IN AN ORGANIZED HEALTH CARE EDUCATION/TRAINING PROGRAM

## 2022-08-01 PROCEDURE — 85018 HEMOGLOBIN: CPT | Performed by: STUDENT IN AN ORGANIZED HEALTH CARE EDUCATION/TRAINING PROGRAM

## 2022-08-01 PROCEDURE — 91306 COVID-19,PF,MODERNA (18+ YRS BOOSTER .25ML): CPT | Performed by: STUDENT IN AN ORGANIZED HEALTH CARE EDUCATION/TRAINING PROGRAM

## 2022-08-01 PROCEDURE — 0064A COVID-19,PF,MODERNA (18+ YRS BOOSTER .25ML): CPT | Performed by: STUDENT IN AN ORGANIZED HEALTH CARE EDUCATION/TRAINING PROGRAM

## 2022-08-01 PROCEDURE — 90677 PCV20 VACCINE IM: CPT | Performed by: STUDENT IN AN ORGANIZED HEALTH CARE EDUCATION/TRAINING PROGRAM

## 2022-08-01 PROCEDURE — 80061 LIPID PANEL: CPT | Performed by: STUDENT IN AN ORGANIZED HEALTH CARE EDUCATION/TRAINING PROGRAM

## 2022-08-01 PROCEDURE — 90471 IMMUNIZATION ADMIN: CPT | Performed by: STUDENT IN AN ORGANIZED HEALTH CARE EDUCATION/TRAINING PROGRAM

## 2022-08-01 PROCEDURE — 86003 ALLG SPEC IGE CRUDE XTRC EA: CPT | Performed by: STUDENT IN AN ORGANIZED HEALTH CARE EDUCATION/TRAINING PROGRAM

## 2022-08-01 PROCEDURE — 83735 ASSAY OF MAGNESIUM: CPT | Performed by: STUDENT IN AN ORGANIZED HEALTH CARE EDUCATION/TRAINING PROGRAM

## 2022-08-01 PROCEDURE — 80053 COMPREHEN METABOLIC PANEL: CPT | Performed by: STUDENT IN AN ORGANIZED HEALTH CARE EDUCATION/TRAINING PROGRAM

## 2022-08-01 PROCEDURE — 82785 ASSAY OF IGE: CPT | Performed by: STUDENT IN AN ORGANIZED HEALTH CARE EDUCATION/TRAINING PROGRAM

## 2022-08-01 PROCEDURE — 99396 PREV VISIT EST AGE 40-64: CPT | Mod: 25 | Performed by: STUDENT IN AN ORGANIZED HEALTH CARE EDUCATION/TRAINING PROGRAM

## 2022-08-01 ASSESSMENT — ENCOUNTER SYMPTOMS
SHORTNESS OF BREATH: 0
HEADACHES: 0
FEVER: 0
ARTHRALGIAS: 0
PALPITATIONS: 0
PARESTHESIAS: 0
FREQUENCY: 0
DYSURIA: 0
NERVOUS/ANXIOUS: 0
MYALGIAS: 0
BREAST MASS: 0
JOINT SWELLING: 0
HEMATOCHEZIA: 0
WEAKNESS: 0
CHILLS: 0
HEARTBURN: 0
DIZZINESS: 0
DIARRHEA: 0
COUGH: 0
SORE THROAT: 0
CONSTIPATION: 0
HEMATURIA: 0
ABDOMINAL PAIN: 0
EYE PAIN: 0
NAUSEA: 0

## 2022-08-01 ASSESSMENT — PAIN SCALES - GENERAL: PAINLEVEL: NO PAIN (0)

## 2022-08-01 NOTE — PATIENT INSTRUCTIONS
Patient Education   Personalized Prevention Plan  You are due for the preventive services outlined below.  Your care team is available to assist you in scheduling these services.  If you have already completed any of these items, please share that information with your care team to update in your medical record.  Health Maintenance Due   Topic Date Due     ANNUAL REVIEW OF HM ORDERS  Never done     Pneumococcal Vaccine (1 - PCV) Never done     Annual Wellness Visit  Never done     Zoster (Shingles) Vaccine (1 of 2) Never done     LUNG CANCER SCREENING  09/04/2021     HPV Screening  04/20/2022     PAP Smear  04/20/2022     COVID-19 Vaccine (4 - Booster for Moderna series) 04/28/2022     Mammogram  08/03/2022       Exercise for a Healthier Heart  You may wonder how you can improve the health of your heart. If you re thinking about exercise, you re on the right track. You don t need to become an athlete. But you do need a certain amount of brisk exercise to help strengthen your heart. If you have been diagnosed with a heart condition, your healthcare provider may advise exercise to help stabilize your condition. To help make exercise a habit, choose safe, fun activities.      Exercise with a friend. When activity is fun, you're more likely to stick with it.   Before you start  Check with your healthcare provider before starting an exercise program. This is especially important if you have not been active for a while. It's also important if you have a long-term (chronic) health problem such as heart disease, diabetes, or obesity. Or if you are at high risk for having these problems.   Why exercise?  Exercising regularly offers many healthy rewards. It can help you do all of the following:     Improve your blood cholesterol level to help prevent further heart trouble    Lower your blood pressure to help prevent a stroke or heart attack    Control diabetes, or reduce your risk of getting this disease    Improve your  heart and lung function    Reach and stay at a healthy weight    Make your muscles stronger so you can stay active    Prevent falls and fractures by slowing the loss of bone mass (osteoporosis)    Manage stress better    Reduce your blood pressure    Improve your sense of self and your body image  Exercise tips      Ease into your routine. Set small goals. Then build on them. If you are not sure what your activity level should be, talk with your healthcare provider first before starting an exercise routine.    Exercise on most days. Aim for a total of 150 minutes (2 hours and 30 minutes) or more of moderate-intensity aerobic activity each week. Or 75 minutes (1 hour and 15 minutes) or more of vigorous-intensity aerobic activity each week. Or try for a combination of both. Moderate activity means that you breathe heavier and your heart rate increases but you can still talk. Think about doing 40 minutes of moderate exercise, 3 to 4 times a week. For best results, activity should last for about 40 minutes to lower blood pressure and cholesterol. It's OK to work up to the 40-minute period over time. Examples of moderate-intensity activity are walking 1 mile in 15 minutes. Or doing 30 to 45 minutes of yard work.    Step up your daily activity level.  Along with your exercise program, try being more active the whole day. Walk instead of drive. Or park further away so that you take more steps each day. Do more household tasks or yard work. You may not be able to meet the advised mount of physical activity. But doing some moderate- or vigorous-intensity aerobic activity can help reduce your risk for heart disease. Your healthcare provider can help you figure out what is best for you.    Choose 1 or more activities you enjoy.  Walking is one of the easiest things you can do. You can also try swimming, riding a bike, dancing, or taking an exercise class.    When to call your healthcare provider  Call your healthcare provider  if you have any of these:     Chest pain or feel dizzy or lightheaded    Burning, tightness, pressure, or heaviness in your chest, neck, shoulders, back, or arms    Abnormal shortness of breath    More joint or muscle pain    A very fast or irregular heartbeat (palpitations)  [employer] last reviewed this educational content on 7/1/2019 2000-2021 [employer]. All rights reserved. This information is not intended as a substitute for professional medical care. Always follow your healthcare professional's instructions.

## 2022-08-01 NOTE — PROGRESS NOTES
ig     SUBJECTIVE:   CC: Nancy Phillips is an 66 year old woman who presents for preventive health visit.       Patient has been advised of split billing requirements and indicates understanding: Yes     Healthy Habits:     Getting at least 3 servings of Calcium per day:  Yes    Bi-annual eye exam:  Yes    Dental care twice a year:  Yes    Sleep apnea or symptoms of sleep apnea:  Excessive snoring    Diet:  Regular (no restrictions)    Frequency of exercise:  1 day/week    Duration of exercise:  N/A    Taking medications regularly:  Yes    Medication side effects:  None    PHQ-2 Total Score: 1    Additional concerns today:  No        Today's PHQ-2 Score:   PHQ-2 ( 1999 Pfizer) 7/31/2022   Q1: Little interest or pleasure in doing things 0   Q2: Feeling down, depressed or hopeless 1   PHQ-2 Score 1   Q1: Little interest or pleasure in doing things Not at all   Q2: Feeling down, depressed or hopeless Several days   PHQ-2 Score 1       Abuse: Current or Past (Physical, Sexual or Emotional) - No  Do you feel safe in your environment? Yes        Social History     Tobacco Use     Smoking status: Current Every Day Smoker     Packs/day: 0.25     Years: 45.00     Pack years: 11.25     Smokeless tobacco: Never Used   Substance Use Topics     Alcohol use: Yes     Alcohol/week: 6.0 - 8.0 standard drinks     If you drink alcohol do you typically have >3 drinks per day or >7 drinks per week? Yes      Alcohol Use 7/31/2022   Prescreen: >3 drinks/day or >7 drinks/week? Yes   AUDIT SCORE  16     AUDIT - Alcohol Use Disorders Identification Test - Reproduced from the World Health Organization Audit 2001 (Second Edition) 7/31/2022   1.  How often do you have a drink containing alcohol? 4 or more times a week   2.  How many drinks containing alcohol do you have on a typical day when you are drinking? 5 or 6   3.  How often do you have five or more drinks on one occasion? Daily or almost daily   4.  How often during the last year have  you found that you were not able to stop drinking once you had started? Never   5.  How often during the last year have you failed to do what was normally expected of you because of drinking? Never   6.  How often during the last year have you needed a first drink in the morning to get yourself going after a heavy drinking session? Never   7.  How often during the last year have you had a feeling of guilt or remorse after drinking? Less than monthly   8.  How often during the last year have you been unable to remember what happened the night before because of your drinking? Less than monthly   9.  Have you or someone else been injured because of your drinking? Yes, but not in the last year   10. Has a relative, friend, doctor or other health care worker been concerned about your drinking or suggested you cut down? Yes, but not in the last year   TOTAL SCORE 16       Reviewed orders with patient.  Reviewed health maintenance and updated orders accordingly - Yes  Lab work is in process  Labs reviewed in EPIC  BP Readings from Last 3 Encounters:   08/01/22 124/64   06/09/22 110/70   05/26/21 100/62    Wt Readings from Last 3 Encounters:   08/01/22 55.2 kg (121 lb 11.2 oz)   06/09/22 55.3 kg (122 lb)   08/03/21 54.9 kg (121 lb)                  Patient Active Problem List   Diagnosis     Osteoporosis - treatment 2nd round since 2018     Personal history of breast cancer - s/p lumpectomy right     Alcohol use     Personal history of nicotine dependence      Asymptomatic menopausal state      Past Surgical History:   Procedure Laterality Date     BIOPSY BREAST       LUMPECTOMY BREAST         Social History     Tobacco Use     Smoking status: Current Every Day Smoker     Packs/day: 0.25     Years: 45.00     Pack years: 11.25     Smokeless tobacco: Never Used   Substance Use Topics     Alcohol use: Yes     Alcohol/week: 6.0 - 8.0 standard drinks     Family History   Problem Relation Age of Onset     Cancer Father      Colon  Cancer Paternal Uncle      Leukemia Mother      No Known Problems Sister      No Known Problems Daughter      No Known Problems Maternal Grandmother      No Known Problems Maternal Grandfather      No Known Problems Paternal Grandmother      No Known Problems Paternal Grandfather      No Known Problems Maternal Aunt      No Known Problems Paternal Aunt      Hereditary Breast and Ovarian Cancer Syndrome No family hx of      Breast Cancer No family hx of      Endometrial Cancer No family hx of      Ovarian Cancer No family hx of          Current Outpatient Medications   Medication Sig Dispense Refill     alendronate (FOSAMAX) 70 MG tablet [ALENDRONATE (FOSAMAX) 70 MG TABLET] TAKE 1 TABLET BY MOUTH ONCE WEEKLY ON EMPTY STOMACH WITH WATER AS DIRECTED 12 tablet 3     b complex vitamins tablet [B COMPLEX VITAMINS TABLET] Take 1 tablet by mouth daily.       calcium carbonate-vitamin D3 (CALCIUM 600 + D,3,) 600 mg(1,500mg) -400 unit per tablet [CALCIUM CARBONATE-VITAMIN D3 (CALCIUM 600 + D,3,) 600 MG(1,500MG) -400 UNIT PER TABLET] Take 1 tablet by mouth daily.       LORazepam (ATIVAN) 0.5 MG tablet [LORAZEPAM (ATIVAN) 0.5 MG TABLET] 1-2 tabs orally once a day as needed  For anxiety 60 tablet 0     lutein 20 mg cap [LUTEIN 20 MG CAP] Take 1 capsule by mouth every other day.       MULTIVITAMIN ORAL [MULTIVITAMIN ORAL] Take 1 tablet by mouth daily.       omega-3/dha/epa/fish oil (FISH OIL-OMEGA-3 FATTY ACIDS) 300-1,000 mg capsule [OMEGA-3/DHA/EPA/FISH OIL (FISH OIL-OMEGA-3 FATTY ACIDS) 300-1,000 MG CAPSULE] Take 2 g by mouth daily.       No Known Allergies    Breast Cancer Screening:    FHS-7:   Breast CA Risk Assessment (FHS-7) 7/31/2022   Did any of your first-degree relatives have breast or ovarian cancer? No   Did any of your relatives have bilateral breast cancer? No   Did any man in your family have breast cancer? No   Did any woman in your family have breast and ovarian cancer? No   Did any woman in your family have  breast cancer before age 50 y? No   Do you have 2 or more relatives with breast and/or ovarian cancer? No   Do you have 2 or more relatives with breast and/or bowel cancer? No     Pertinent mammograms are reviewed under the imaging tab.    History of abnormal Pap smear: NO - age 30-65 PAP every 5 years with negative HPV co-testing recommended  PAP / HPV 4/20/2017   PAP Negative for squamous intraepithelial lesion or malignancy  Electronically signed by Catarina Nguyen CT (ASCP) on 4/28/2017 at  1:37 PM       Reviewed and updated as needed this visit by clinical staff   Tobacco  Allergies  Meds                Reviewed and updated as needed this visit by Provider                   Past Medical History:   Diagnosis Date     Breast cancer (H) 2006     Hx of radiation therapy      Osteoporosis       Past Surgical History:   Procedure Laterality Date     BIOPSY BREAST       LUMPECTOMY BREAST         Review of Systems   Constitutional: Negative for chills and fever.   HENT: Positive for ear pain. Negative for congestion, hearing loss and sore throat.    Eyes: Negative for pain and visual disturbance.   Respiratory: Negative for cough and shortness of breath.    Cardiovascular: Negative for chest pain, palpitations and peripheral edema.   Gastrointestinal: Negative for abdominal pain, constipation, diarrhea, heartburn, hematochezia and nausea.   Breasts:  Negative for tenderness, breast mass and discharge.   Genitourinary: Negative for dysuria, frequency, genital sores, hematuria, pelvic pain, urgency, vaginal bleeding and vaginal discharge.   Musculoskeletal: Negative for arthralgias, joint swelling and myalgias.   Skin: Negative for rash.   Neurological: Negative for dizziness, weakness, headaches and paresthesias.   Psychiatric/Behavioral: Negative for mood changes. The patient is not nervous/anxious.        OBJECTIVE:   /64 (BP Location: Right arm, Patient Position: Sitting, Cuff Size: Adult Regular)    "Pulse 67   Temp 98.1  F (36.7  C) (Oral)   Ht 1.549 m (5' 1\")   Wt 55.2 kg (121 lb 11.2 oz)   SpO2 96%   BMI 23.00 kg/m    Physical Exam  GENERAL: healthy, alert and no distress  EYES: Eyes grossly normal to inspection, PERRL and conjunctivae and sclerae normal  HENT: ear canalsl, nose and mouth without ulcers or lesions  Left TM: unremarkable  Right TM: fluid behind TM without infection or perforation.  NECK: no adenopathy, no asymmetry, masses, or scars and thyroid normal to palpation  RESP: lungs clear to auscultation - no rales, rhonchi or wheezes  BREAST: declines  CV: regular rate and rhythm, normal S1 S2, no S3 or S4, no murmur, click or rub, no peripheral edema and peripheral pulses strong  ABDOMEN: soft, nontender, no hepatosplenomegaly, no masses and bowel sounds normal  MS: no gross musculoskeletal defects noted, no edema  SKIN: no suspicious lesions or rashes  NEURO: Normal strength and tone, mentation intact and speech normal  PSYCH: mentation appears normal, affect normal/bright        ASSESSMENT/PLAN:       ICD-10-CM    1. Encounter for Medicare annual wellness exam  Z00.00 Pneumococcal 20 Valent Conjugate (Prevnar 20)     COVID-19,PF,MODERNA (18+ YRS BOOSTER .25ML)     *[place] Screening Digital Bilateral     Comprehensive metabolic panel (BMP + Alb, Alk Phos, ALT, AST, Total. Bili, TP)     Hemoglobin     Lipid Profile (Chol, Trig, HDL, LDL calc)     Magnesium   2. Cervical cancer screening  Z12.4    3. Visit for screening mammogram  Z12.31    4. Other screening mammogram  Z12.31    5. Dysfunction of right eustachian tube  H69.81    6. Alcohol use  Z72.89 Comprehensive metabolic panel (BMP + Alb, Alk Phos, ALT, AST, Total. Bili, TP)   7. Personal history of nicotine dependence   Z87.891 SMOKING CESSATION COUNSELING 3-10 MIN   8. Nicotine dependence, uncomplicated, unspecified nicotine product type  F17.200 SMOKING CESSATION COUNSELING 3-10 MIN   9. Age-related osteoporosis without current " pathological fracture  M81.0    10. Encounter for immunization   Z23 Pneumococcal 20 Valent Conjugate (Prevnar 20)   11. Encounter for screening mammogram for malignant neoplasm of breast   Z12.31 *MA Screening Digital Bilateral   12. Asymptomatic menopausal state   Z78.0 CANCELED: DX Hip/Pelvis/Spine   13. 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 Doesn't feel it is in an issue. Is not necessarily trying to cut down. Doesn't feel she needs help cutting down.   Tobacco use/marijuana use/drug use:6 cigarettes a day, 30+ years   Can get a cough  But no baseline SOB. precontemplative about quitting. 3-5 mins spent on tobacco cessation counseling.   Marijuana smoking - recreational   Mental health:notes it is 'good'.   Vaccines:ordered   Blood work:ordered   DEXA:  - osteoporosis with high risk fracture. Repeat in . Continue fosamax. Was on a drug holiday and then resume in 2018. Doing well on it.   Menses/menopause: no issues in   Last Pap smear:due but declines today  Mammogram:ordered. Last  - can't see results. (+) hx of breast cancer in remission for several years.Re-ordered     Hx of cancer   Colon cancer screenin - colonscopy, due next , polyp.     IADLs/ADLs intact:yes  Driving:yes  Memory:ok  Constipation, vision, hearing concerns:no concerns   Falls over the last year:none reported    Right eustachian tube dysfxn:   Has been ongoing for many months   Is mild but feels like it needs to pop   PEx consistent with fluid behind right TM wihout infection   No hearing loss   Tried flonase without success   Offered patient  "to trial antihistamines or go to ENT. Patient declined and opted to monitor.       Patient has been advised of split billing requirements and indicates understanding: Yes    COUNSELING:  Reviewed preventive health counseling, as reflected in patient instructions    Estimated body mass index is 23 kg/m  as calculated from the following:    Height as of this encounter: 1.549 m (5' 1\").    Weight as of this encounter: 55.2 kg (121 lb 11.2 oz).        She reports that she has been smoking. She has a 11.25 pack-year smoking history. She has never used smokeless tobacco.  Nicotine/Tobacco Cessation Plan:   Information offered: Patient not interested at this time      Lisa Schwarz DO  [facility]    She is at risk for lack of exercise and has been provided with information to increase physical activity for the benefit of her well-being.  "

## 2022-08-01 NOTE — LETTER
August 9, 2022      Nancy Phillips  [address] [place] [address]        Dear ,    We are writing to inform you of your test results.    Hi Nancy,      Your cholesterol levels are mildly elevated but overall stable.      Rest of the your bloodwork looks food.      Repeat in 1 year.      Would recommend coming in for your pap smear sometime this year.      Lisa Schwarz DO    Resulted Orders   Comprehensive metabolic panel (BMP + Alb, Alk Phos, ALT, AST, Total. Bili, TP)   Result Value Ref Range    Sodium 143 136 - 145 mmol/L    Potassium 4.9 3.4 - 5.3 mmol/L    Creatinine 0.58 0.51 - 0.95 mg/dL    Urea Nitrogen 12.5 8.0 - 23.0 mg/dL    Chloride 105 98 - 107 mmol/L    Carbon Dioxide (CO2) 27 22 - 29 mmol/L    Anion Gap 11 7 - 15 mmol/L    Glucose 86 70 - 99 mg/dL    Calcium 9.3 8.8 - 10.2 mg/dL    Protein Total 7.4 6.4 - 8.3 g/dL    Albumin 4.6 3.5 - 5.2 g/dL    Bilirubin Total 0.4 <=1.2 mg/dL    Alkaline Phosphatase 75 35 - 104 U/L    AST 31 10 - 35 U/L    ALT 26 10 - 35 U/L    GFR Estimate >90 >60 mL/min/1.73m2      Comment:      Effective December 21, 2021 eGFRcr in adults is calculated using the 2021 CKD-EPI creatinine equation which includes age and gender (Nilo leija al., NE, DOI: 10.1056/SCEXuw6749773)   Hemoglobin   Result Value Ref Range    Hemoglobin 14.4 11.7 - 15.7 g/dL   Lipid Profile (Chol, Trig, HDL, LDL calc)   Result Value Ref Range    Cholesterol 229 (H) <200 mg/dL    Triglycerides 85 <150 mg/dL    Direct Measure  >=50 mg/dL    LDL Cholesterol Calculated 81 <=100 mg/dL    Non HDL Cholesterol 98 <130 mg/dL    Narrative    Cholesterol  Desirable:  <200 mg/dL    Triglycerides  Normal:  Less than 150 mg/dL  Borderline High:  150-199 mg/dL  High:  200-499 mg/dL  Very High:  Greater than or equal to 500 mg/dL    Direct Measure HDL  Female:  Greater than or equal to 50 mg/dL   Male:  Greater than or equal to 40 mg/dL    LDL Cholesterol  Desirable:  <100mg/dL  Above  Desirable:  100-129 mg/dL   Borderline High:  130-159 mg/dL   High:  160-189 mg/dL   Very High:  >= 190 mg/dL    Non HDL Cholesterol  Desirable:  130 mg/dL  Above Desirable:  130-159 mg/dL  Borderline High:  160-189 mg/dL  High:  190-219 mg/dL  Very High:  Greater than or equal to 220 mg/dL   Magnesium   Result Value Ref Range    Magnesium 2.0 1.7 - 2.3 mg/dL       If you have any questions or concerns, please call the clinic at the number listed above.       Sincerely,      Lisa Schwarz, DO

## 2022-08-09 ENCOUNTER — TELEPHONE (OUTPATIENT)
Dept: FAMILY MEDICINE | Facility: CLINIC | Age: 67
End: 2022-08-09

## 2022-08-09 DIAGNOSIS — M81.0 AGE-RELATED OSTEOPOROSIS WITHOUT CURRENT PATHOLOGICAL FRACTURE: Primary | ICD-10-CM

## 2022-08-09 NOTE — TELEPHONE ENCOUNTER
Order/Referral Request    Who is requesting: Patient    Orders being requested: DEXA Scan    Reason service is needed/diagnosis: Routine Health Follow Up    When are orders needed by: As soon as feasible for scheduling    Has this been discussed with Provider: Yes 08/01/2022 Visit.      Patient has MAMMO done at outside facility and DEXA with [facility].    Appears DEXA order was cancelled instead of MAMMO.  Patient is requesting DEXA order placed    Does patient have an appointment scheduled?: No unable to schedule until order is placed    Where to send orders: EPIC    Could we send this information to you in [employer] or would you prefer to receive a phone call?:   Patient would like to be contacted via [employer] - once corrected (last log on 08/09/2022)

## 2022-08-15 ENCOUNTER — TELEPHONE (OUTPATIENT)
Dept: FAMILY MEDICINE | Facility: CLINIC | Age: 67
End: 2022-08-15

## 2022-08-15 NOTE — TELEPHONE ENCOUNTER
----- Message from Lisa Schwarz DO sent at 8/14/2022  3:29 PM CDT -----  Regarding: FW: Welcome to Medicare - No vision check  Hey guys,     Can we call this patient and have her come back for a vision screening?     Lisa Schwarz DO         ----- Message -----  From: Savana Morales  Sent: 8/10/2022  10:51 AM CDT  To: Lisa Schwarz DO  Subject: Welcome to Medicare - No vision check            DOS 8/1/22    Hi Dr. Schwarz,    Patient's Medicare Part B became effective on 8/1/22 so this is her WTM visit and a vision screening is required.  Will you or your MA please contact the patient and ask if they would be willing to come back in and have this done.  We can't bill the WTM without it.    Thank you,  Jeanette

## 2022-08-15 NOTE — TELEPHONE ENCOUNTER
Left message for pt to call back- please assist in scheduling MA visit to complete vision screening (see notes below)

## 2022-08-19 ENCOUNTER — ALLIED HEALTH/NURSE VISIT (OUTPATIENT)
Dept: FAMILY MEDICINE | Facility: CLINIC | Age: 67
End: 2022-08-19
Payer: COMMERCIAL

## 2022-08-19 DIAGNOSIS — Z01.00 VISION TEST: Primary | ICD-10-CM

## 2022-08-19 PROCEDURE — 99207 PR NO CHARGE NURSE ONLY: CPT

## 2022-08-19 NOTE — PROGRESS NOTES
Nedra Cruz is a 66 year old, presenting for the following health issues:  Vision Screening    Vision Acuity Screen     Vision Acuity Tool -- -- HOTV   RIGHT EYE -- -- 10/12.5 (20/25)   LEFT EYE -- -- 10/12.5 (20/25)   Is there a two line difference? -- -- No   Vision Screen Results -- -- Pass     Pt is seeing eye doctor in September.

## 2022-08-26 ENCOUNTER — LAB (OUTPATIENT)
Dept: FAMILY MEDICINE | Facility: CLINIC | Age: 67
End: 2022-08-26
Payer: COMMERCIAL

## 2022-08-26 DIAGNOSIS — Z20.822 SUSPECTED COVID-19 VIRUS INFECTION: ICD-10-CM

## 2022-08-26 LAB — SARS-COV-2 RNA RESP QL NAA+PROBE: POSITIVE

## 2022-08-26 PROCEDURE — U0003 INFECTIOUS AGENT DETECTION BY NUCLEIC ACID (DNA OR RNA); SEVERE ACUTE RESPIRATORY SYNDROME CORONAVIRUS 2 (SARS-COV-2) (CORONAVIRUS DISEASE [COVID-19]), AMPLIFIED PROBE TECHNIQUE, MAKING USE OF HIGH THROUGHPUT TECHNOLOGIES AS DESCRIBED BY CMS-2020-01-R: HCPCS

## 2022-08-26 PROCEDURE — U0005 INFEC AGEN DETEC AMPLI PROBE: HCPCS

## 2022-08-27 ENCOUNTER — TELEPHONE (OUTPATIENT)
Dept: NURSING | Facility: CLINIC | Age: 67
End: 2022-08-27

## 2022-08-27 NOTE — TELEPHONE ENCOUNTER
Patient classified as COVID treatment eligible by Epic high risk algorithm:  Yes    Coronavirus (COVID-19) Notification    Reason for call  Notify of POSITIVE COVID-19 lab result, assess symptoms,  review [facility] recommendations    Lab Result   Lab test for 2019-nCoV rRt-PCR or SARS-COV-2 PCR  Oropharyngeal AND/OR nasopharyngeal swabs were POSITIVE for 2019-nCoV RNA [OR] SARS-COV-2 RNA (COVID-19) RNA     We have been unable to reach patient by phone at this time to notify of their Positive COVID-19 result.    Left voicemail message requesting a call back to [phone] [facility] for results.        A Positive COVID-19 letter will be sent via [employer] or the mail.    Linnea Figueroa

## 2022-09-07 ENCOUNTER — TRANSFERRED RECORDS (OUTPATIENT)
Dept: HEALTH INFORMATION MANAGEMENT | Facility: CLINIC | Age: 67
End: 2022-09-07

## 2022-09-18 ENCOUNTER — HEALTH MAINTENANCE LETTER (OUTPATIENT)
Age: 67
End: 2022-09-18

## 2022-10-14 ENCOUNTER — OFFICE VISIT (OUTPATIENT)
Dept: AUDIOLOGY | Facility: CLINIC | Age: 67
End: 2022-10-14
Payer: COMMERCIAL

## 2022-10-14 DIAGNOSIS — H90.3 SENSORINEURAL HEARING LOSS, BILATERAL: Primary | ICD-10-CM

## 2022-10-14 PROCEDURE — 92557 COMPREHENSIVE HEARING TEST: CPT | Performed by: AUDIOLOGIST

## 2022-10-14 PROCEDURE — 92567 TYMPANOMETRY: CPT | Performed by: AUDIOLOGIST

## 2022-10-14 NOTE — PROGRESS NOTES
AUDIOLOGY REPORT    SUMMARY: Audiology visit completed. See audiogram for results.      RECOMMENDATIONS: Follow-up with ENT.    Jayde Turner, CCC-A  [place] Licensed Audiologist #[id number]

## 2022-10-19 ENCOUNTER — OFFICE VISIT (OUTPATIENT)
Dept: OTOLARYNGOLOGY | Facility: CLINIC | Age: 67
End: 2022-10-19
Payer: COMMERCIAL

## 2022-10-19 DIAGNOSIS — H93.8X3 SENSATION OF PRESSURE IN EAR, BILATERAL: ICD-10-CM

## 2022-10-19 DIAGNOSIS — M26.623 BILATERAL TEMPOROMANDIBULAR JOINT PAIN: Primary | ICD-10-CM

## 2022-10-19 DIAGNOSIS — H90.5 SENSORINEURAL HEARING LOSS (SNHL), UNSPECIFIED LATERALITY: Primary | ICD-10-CM

## 2022-10-19 PROCEDURE — 99203 OFFICE O/P NEW LOW 30 MIN: CPT | Performed by: OTOLARYNGOLOGY

## 2022-10-19 NOTE — PROGRESS NOTES
"HPI: This patient is a 68yo F who presents to the clinic for evaluation of episodic ear pain. The pain is a pressure sensation in and around the ear that comes and goes. There is no known clenching or grinding behaviors. She was told twice that she had \"fluid in [her] ears\" but treatments for that have been ineffective. No history of ear issues. Denies otorrhea, hearing loss, tinnitus, vertigo, and other major symptoms such as fever, weight loss, odynophagia, dysphagia, and hemoptysis.     Past medical history, surgical history, social history, family history, medications, and allergies have been reviewed with the patient and are documented above.    Review of Systems: a 10-system review was performed. Pertinent positives are noted in the HPI and on a separate scanned document in the chart.    PHYSICAL EXAMINATION:  GEN: no acute distress, normocephalic  EYES: extraocular movements are intact, pupils are equal and round. Sclera clear.   EARS: auricles are normally formed. The external auditory canals are clear with minimal to no cerumen. Tympanic membranes are intact bilaterally with no signs of infection, effusion, retractions, or perforations.  NOSE: anterior nares are patent. There are no masses or lesions. The septum is non-obstructing.  OC/OP: clear, dentition is in good repair. The tongue and palate are fully mobile and symmetric. The floor of mouth, base of tongue, and tonsils are soft and symmetric.  NECK: soft and supple. No lymphadenopathy or masses. Airway is midline. Rather significant tenderness of the bilateral TMJ.  NEURO: CN VII and XII symmetric. alert and oriented. No spontaneous nystagmus. Gait is normal.  PULM: breathing comfortably on room air, normal chest expansion with respiration  CARDS: no cyanosis or clubbing. Normal carotid pulses.    AUDIOGRAM: normal to mild SNHL bilaterally with 100% WRS and type A tymps    MEDICAL DECISION-MAKING: This patient is a 68yo F with ear discomfort from TMD. " Discussed soft diet, NSAIDS, and a bite guard. Can follow-up with a TMJ clinic for further management as needed.

## 2022-10-19 NOTE — LETTER
"    10/19/2022         RE: Nancy Phillips  [address] [place] [address]        Dear Colleague,    Thank you for referring your patient, Nancy Phillips, to the [facility]. Please see a copy of my visit note below.    HPI: This patient is a 68yo F who presents to the clinic for evaluation of episodic ear pain. The pain is a pressure sensation in and around the ear that comes and goes. There is no known clenching or grinding behaviors. She was told twice that she had \"fluid in [her] ears\" but treatments for that have been ineffective. No history of ear issues. Denies otorrhea, hearing loss, tinnitus, vertigo, and other major symptoms such as fever, weight loss, odynophagia, dysphagia, and hemoptysis.     Past medical history, surgical history, social history, family history, medications, and allergies have been reviewed with the patient and are documented above.    Review of Systems: a 10-system review was performed. Pertinent positives are noted in the HPI and on a separate scanned document in the chart.    PHYSICAL EXAMINATION:  GEN: no acute distress, normocephalic  EYES: extraocular movements are intact, pupils are equal and round. Sclera clear.   EARS: auricles are normally formed. The external auditory canals are clear with minimal to no cerumen. Tympanic membranes are intact bilaterally with no signs of infection, effusion, retractions, or perforations.  NOSE: anterior nares are patent. There are no masses or lesions. The septum is non-obstructing.  OC/OP: clear, dentition is in good repair. The tongue and palate are fully mobile and symmetric. The floor of mouth, base of tongue, and tonsils are soft and symmetric.  NECK: soft and supple. No lymphadenopathy or masses. Airway is midline. Rather significant tenderness of the bilateral TMJ.  NEURO: CN VII and XII symmetric. alert and oriented. No spontaneous nystagmus. Gait is normal.  PULM: breathing comfortably on room " air, normal chest expansion with respiration  CARDS: no cyanosis or clubbing. Normal carotid pulses.    AUDIOGRAM: normal to mild SNHL bilaterally with 100% WRS and type A tymps    MEDICAL DECISION-MAKING: This patient is a 68yo F with ear discomfort from TMD. Discussed soft diet, NSAIDS, and a bite guard. Can follow-up with a TMJ clinic for further management as needed.        Again, thank you for allowing me to participate in the care of your patient.        Sincerely,        Lori Chavez MD

## 2022-11-09 ENCOUNTER — HOSPITAL ENCOUNTER (OUTPATIENT)
Dept: BONE DENSITY | Facility: HOSPITAL | Age: 67
Discharge: HOME | End: 2022-11-09
Attending: FAMILY MEDICINE | Admitting: FAMILY MEDICINE
Payer: COMMERCIAL

## 2022-11-09 DIAGNOSIS — M81.0 AGE-RELATED OSTEOPOROSIS WITHOUT CURRENT PATHOLOGICAL FRACTURE: ICD-10-CM

## 2022-11-09 PROCEDURE — 77080 DXA BONE DENSITY AXIAL: CPT

## 2022-11-11 ENCOUNTER — TELEPHONE (OUTPATIENT)
Dept: FAMILY MEDICINE | Facility: CLINIC | Age: 67
End: 2022-11-11

## 2022-11-11 DIAGNOSIS — M81.0 OSTEOPOROSIS: Primary | ICD-10-CM

## 2022-11-11 NOTE — TELEPHONE ENCOUNTER
Patient calling in regards to message left by Dr Lopez within her DXA scan results.  Patient has read the message and is agreeable to consultation with Osteoporosis specialist.  Please place orders for patient to move forward with this.

## 2023-01-28 ENCOUNTER — HEALTH MAINTENANCE LETTER (OUTPATIENT)
Age: 68
End: 2023-01-28

## 2023-03-01 DIAGNOSIS — M81.0 AGE-RELATED OSTEOPOROSIS WITHOUT CURRENT PATHOLOGICAL FRACTURE: ICD-10-CM

## 2023-03-01 RX ORDER — ALENDRONATE SODIUM 70 MG/1
TABLET ORAL
Qty: 12 TABLET | Refills: 0 | Status: SHIPPED | OUTPATIENT
Start: 2023-03-01 | End: 2023-06-14

## 2023-03-02 NOTE — TELEPHONE ENCOUNTER
"Last Written Prescription Date: 6/9/2022  Last Fill Quantity: 12,  # refills: 3   Last office visit provider: 8/1/2022 with DR ANDERS Schwarz     Requested Prescriptions   Pending Prescriptions Disp Refills     alendronate (FOSAMAX) 70 MG tablet [Pharmacy Med Name: ALENDRONATE 70MG TABLETS] 12 tablet 3     Sig: TAKE 1 TABLET BY MOUTH ONCE WEEKLY ON EMPTY STOMACH WITH WATER AS DIRECTED       Bisphosphonates Passed - 3/1/2023  8:02 AM        Passed - Recent (12 mo) or future (30 days) visit within the authorizing provider's specialty     Patient has had an office visit with the authorizing provider or a provider within the authorizing providers department within the previous 12 mos or has a future within next 30 days. See \"Patient Info\" tab in inbasket, or \"Choose Columns\" in Meds & Orders section of the refill encounter.              Passed - Dexa on file within past 2 years     Please review last Dexa result.           Passed - Medication is active on med list        Passed - Patient is age 18 or older        Passed - Normal serum creatinine on file within past 12 months     Recent Labs   Lab Test 08/01/22  0930   CR 0.58       Ok to refill medication if creatinine is low               Krystyna Law RN 03/01/23 7:46 PM  "

## 2023-03-09 NOTE — TELEPHONE ENCOUNTER
Reason for Disposition    SEVERE pain (e.g., excruciating, unable to do any normal activities)    Additional Information    Negative: Followed an ankle or foot injury    Negative: Ankle pain is the main symptom    Negative: Entire foot is cool or blue in comparison to other foot    Negative: Purple or black skin on foot or toe    Negative: Red area or streak and fever    Negative: Swollen foot and fever    Negative: Patient sounds very sick or weak to the triager    Protocols used: FOOT PAIN-A-OH    
Seen last week and now worse.  Has been soaking in vinaeger water.  Hard to walk.  Toes are red.  Skin peeling of bottom of foot.    Cannot put shoe on.    Daughter will drive, needs to be seen again. Dr. Lloyd.     Warm transferred to scheduling    Angela Yost, RN [facility] Triage Nurse Advisor    
Declines

## 2023-06-01 ASSESSMENT — ENCOUNTER SYMPTOMS
EYE PAIN: 0
EYE REDNESS: 0
POOR WOUND HEALING: 0
SKIN CHANGES: 1
EYE WATERING: 0
EYE IRRITATION: 0
DOUBLE VISION: 0
NAIL CHANGES: 1

## 2023-06-05 NOTE — PROGRESS NOTES
"Subjective:    New patient, no prior Endocrine notes including Care Everywhere    Nancy Phillips is a 67 year old female who presents for osteoporosis.     We reviewed the osteoporosis dictation template.     DEXA 6/18/2021:   -Lumbar spine stable compared to 5/2019  -Total mean hip stable compared to 5/2019    DEXA 11/9/2022:  -L1 - L4 T-score -2.7, stable compared to 2021   -Lowest T-score at the hips is -2.2 at the left femoral neck, with \"significant\" -1.8% decline at the total mean hip compared to 6/2021    No recent spine imaging.    2020: rib fractures and transverse process fractures of L2 - L4 after a fall backwards while on a single step. No other fractures.     Balance is good. No recent falls.     No prior nephrolithiasis. Alcoholism. Active tobacco use - not interested in cessation.     -No prior hypercalcemia    Fosamax:  -Fosamax - 5 year course likely around 2010 - 2015  -Fosamax 70 mg once weekly - resumed in 2018 (unclear when in 2018 but she was on it as of 5/2018) and taken to date, rare missed doses, she takes it appropriately to maximize absorption and minimize the risk of esophagitis      She takes a combination calcium - vitamin D pill. She takes a MVI. She has 1-2 servings of dairy per day.     No other pharmacologic therapy to reduce fracture risk.     No GERD. No recent/upcoming tooth extraction or dental implant.     History of breast cancer in 2006, treated with surgery and radiation. No chemotherapy. Prior lacunar infarcts, no prior MI.      She exercises.     Objective:    BMI 21.98 kg/m2, /66    No scleral icterus. Dentition reasonable. Mild kyphosis. Radial pulse with a RRR.     Assessment/Plan:    # Osteoporosis    Bone mineral density has declined despite adherence with Fosamax.  We will start by obtaining a lab evaluation to look for secondary causes of increased fracture risk including 24-hour urine calcium.    Because of the decline in bone mineral density we will repeat " a DEXA at the 1 year julia around December 2023.  We will also get a spine x-ray at that time.    For now continue Fosamax.  Aim for 1000 units of vitamin D daily.  Aim for 1200 mg of calcium intake daily, either diet alone or diet plus supplements.  I did give her the handout on calcium sources in foods.    We also reviewed limiting alcohol intake and tobacco cessation in detail.  Counseling performed.  She will let me know if she wants referrals to help with cessation.    Return to see me in about 6 months after DEXA and spine x-ray.    45 minutes spent on the date of the encounter doing chart review, history and exam, documentation and further activities as noted above.

## 2023-06-06 ENCOUNTER — OFFICE VISIT (OUTPATIENT)
Dept: ENDOCRINOLOGY | Facility: CLINIC | Age: 68
End: 2023-06-06
Attending: FAMILY MEDICINE
Payer: COMMERCIAL

## 2023-06-06 ENCOUNTER — LAB (OUTPATIENT)
Dept: LAB | Facility: CLINIC | Age: 68
End: 2023-06-06
Payer: COMMERCIAL

## 2023-06-06 VITALS
HEIGHT: 61 IN | DIASTOLIC BLOOD PRESSURE: 66 MMHG | HEART RATE: 76 BPM | BODY MASS INDEX: 22.09 KG/M2 | SYSTOLIC BLOOD PRESSURE: 124 MMHG | WEIGHT: 117 LBS

## 2023-06-06 DIAGNOSIS — M81.0 AGE-RELATED OSTEOPOROSIS WITHOUT CURRENT PATHOLOGICAL FRACTURE: ICD-10-CM

## 2023-06-06 DIAGNOSIS — M81.0 AGE-RELATED OSTEOPOROSIS WITHOUT CURRENT PATHOLOGICAL FRACTURE: Primary | ICD-10-CM

## 2023-06-06 DIAGNOSIS — Z79.83 HISTORY OF ONGOING TREATMENT WITH ALENDRONATE: ICD-10-CM

## 2023-06-06 DIAGNOSIS — Z71.6 ENCOUNTER FOR TOBACCO USE CESSATION COUNSELING: ICD-10-CM

## 2023-06-06 DIAGNOSIS — F10.20 ALCOHOLISM (H): ICD-10-CM

## 2023-06-06 DIAGNOSIS — Z78.0 POSTMENOPAUSAL STATUS: ICD-10-CM

## 2023-06-06 DIAGNOSIS — Z72.0 TOBACCO ABUSE: ICD-10-CM

## 2023-06-06 LAB
ALBUMIN SERPL BCG-MCNC: 4.5 G/DL (ref 3.5–5.2)
CALCIUM SERPL-MCNC: 10.4 MG/DL (ref 8.8–10.2)
CREAT SERPL-MCNC: 0.56 MG/DL (ref 0.51–0.95)
GFR SERPL CREATININE-BSD FRML MDRD: >90 ML/MIN/1.73M2
PHOSPHATE SERPL-MCNC: 3.5 MG/DL (ref 2.5–4.5)
TOTAL PROTEIN SERUM FOR ELP: 7.5 G/DL (ref 6.4–8.3)
TSH SERPL DL<=0.005 MIU/L-ACNC: 2.27 UIU/ML (ref 0.3–4.2)

## 2023-06-06 PROCEDURE — 84443 ASSAY THYROID STIM HORMONE: CPT | Mod: GA

## 2023-06-06 PROCEDURE — 99000 SPECIMEN HANDLING OFFICE-LAB: CPT

## 2023-06-06 PROCEDURE — 84100 ASSAY OF PHOSPHORUS: CPT

## 2023-06-06 PROCEDURE — 82306 VITAMIN D 25 HYDROXY: CPT

## 2023-06-06 PROCEDURE — 82565 ASSAY OF CREATININE: CPT

## 2023-06-06 PROCEDURE — 84165 PROTEIN E-PHORESIS SERUM: CPT

## 2023-06-06 PROCEDURE — 82040 ASSAY OF SERUM ALBUMIN: CPT

## 2023-06-06 PROCEDURE — 84155 ASSAY OF PROTEIN SERUM: CPT

## 2023-06-06 PROCEDURE — 84166 PROTEIN E-PHORESIS/URINE/CSF: CPT

## 2023-06-06 PROCEDURE — 84080 ASSAY ALKALINE PHOSPHATASES: CPT | Mod: 90

## 2023-06-06 PROCEDURE — 36415 COLL VENOUS BLD VENIPUNCTURE: CPT

## 2023-06-06 PROCEDURE — 82310 ASSAY OF CALCIUM: CPT

## 2023-06-06 PROCEDURE — 82784 ASSAY IGA/IGD/IGG/IGM EACH: CPT

## 2023-06-06 PROCEDURE — 86364 TISS TRNSGLTMNASE EA IG CLAS: CPT

## 2023-06-06 PROCEDURE — 99204 OFFICE O/P NEW MOD 45 MIN: CPT | Performed by: INTERNAL MEDICINE

## 2023-06-06 NOTE — LETTER
"    6/6/2023         RE: Nancy Phillips  [address] [place] [address]        Dear Colleague,    Thank you for referring your patient, Nancy Phillips, to the [facility]. Please see a copy of my visit note below.    Subjective:    New patient, no prior Endocrine notes including Care Everywhere    Nancy Phillips is a 67 year old female who presents for osteoporosis.     We reviewed the osteoporosis dictation template.     DEXA 6/18/2021:   -Lumbar spine stable compared to 5/2019  -Total mean hip stable compared to 5/2019    DEXA 11/9/2022:  -L1 - L4 T-score -2.7, stable compared to 2021   -Lowest T-score at the hips is -2.2 at the left femoral neck, with \"significant\" -1.8% decline at the total mean hip compared to 6/2021    No recent spine imaging.    2020: rib fractures and transverse process fractures of L2 - L4 after a fall backwards while on a single step. No other fractures.     Balance is good. No recent falls.     No prior nephrolithiasis. Alcoholism. Active tobacco use - not interested in cessation.     -No prior hypercalcemia    Fosamax:  -Fosamax - 5 year course likely around 2010 - 2015  -Fosamax 70 mg once weekly - resumed in 2018 (unclear when in 2018 but she was on it as of 5/2018) and taken to date, rare missed doses, she takes it appropriately to maximize absorption and minimize the risk of esophagitis      She takes a combination calcium - vitamin D pill. She takes a MVI. She has 1-2 servings of dairy per day.     No other pharmacologic therapy to reduce fracture risk.     No GERD. No recent/upcoming tooth extraction or dental implant.     History of breast cancer in 2006, treated with surgery and radiation. No chemotherapy. Prior lacunar infarcts, no prior MI.      She exercises.     Objective:    BMI 21.98 kg/m2, /66    No scleral icterus. Dentition reasonable. Mild kyphosis. Radial pulse with a RRR.     Assessment/Plan:    # Osteoporosis    Bone " mineral density has declined despite adherence with Fosamax.  We will start by obtaining a lab evaluation to look for secondary causes of increased fracture risk including 24-hour urine calcium.    Because of the decline in bone mineral density we will repeat a DEXA at the 1 year julia around December 2023.  We will also get a spine x-ray at that time.    For now continue Fosamax.  Aim for 1000 units of vitamin D daily.  Aim for 1200 mg of calcium intake daily, either diet alone or diet plus supplements.  I did give her the handout on calcium sources in foods.    We also reviewed limiting alcohol intake and tobacco cessation in detail.  Counseling performed.  She will let me know if she wants referrals to help with cessation.    Return to see me in about 6 months after DEXA and spine x-ray.    45 minutes spent on the date of the encounter doing chart review, history and exam, documentation and further activities as noted above.       Again, thank you for allowing me to participate in the care of your patient.        Sincerely,        Mickey Adams MD

## 2023-06-07 ENCOUNTER — APPOINTMENT (OUTPATIENT)
Dept: LAB | Facility: CLINIC | Age: 68
End: 2023-06-07
Payer: COMMERCIAL

## 2023-06-07 LAB
ALBUMIN SERPL ELPH-MCNC: 4.4 G/DL (ref 3.7–5.1)
ALPHA1 GLOB SERPL ELPH-MCNC: 0.3 G/DL (ref 0.2–0.4)
ALPHA2 GLOB SERPL ELPH-MCNC: 0.7 G/DL (ref 0.5–0.9)
B-GLOBULIN SERPL ELPH-MCNC: 0.9 G/DL (ref 0.6–1)
GAMMA GLOB SERPL ELPH-MCNC: 1.2 G/DL (ref 0.7–1.6)
IGA SERPL-MCNC: 340 MG/DL (ref 84–499)
M PROTEIN SERPL ELPH-MCNC: 0 G/DL
PROT PATTERN SERPL ELPH-IMP: NORMAL
TTG IGA SER-ACNC: 0.8 U/ML
TTG IGG SER-ACNC: <0.6 U/ML

## 2023-06-08 LAB — PROT PATTERN UR ELPH-IMP: NORMAL

## 2023-06-09 LAB — ALP BONE SERPL-MCNC: 12.1 UG/L

## 2023-06-11 LAB
DEPRECATED CALCIDIOL+CALCIFEROL SERPL-MC: <55 UG/L (ref 20–75)
VITAMIN D2 SERPL-MCNC: <5 UG/L
VITAMIN D3 SERPL-MCNC: 50 UG/L

## 2023-06-13 DIAGNOSIS — M81.0 AGE-RELATED OSTEOPOROSIS WITHOUT CURRENT PATHOLOGICAL FRACTURE: ICD-10-CM

## 2023-06-14 ENCOUNTER — APPOINTMENT (OUTPATIENT)
Dept: LAB | Facility: CLINIC | Age: 68
End: 2023-06-14
Payer: COMMERCIAL

## 2023-06-14 LAB
CALCIUM 24H UR-MRATE: 0.35 G/SPEC (ref 0.1–0.3)
CALCIUM UR-MCNC: 15.9 MG/DL
COLLECT DURATION TIME UR: 24 H
SPECIMEN VOL UR: 2200 ML

## 2023-06-14 PROCEDURE — 81050 URINALYSIS VOLUME MEASURE: CPT

## 2023-06-14 PROCEDURE — 82340 ASSAY OF CALCIUM IN URINE: CPT

## 2023-06-14 RX ORDER — ALENDRONATE SODIUM 70 MG/1
TABLET ORAL
Qty: 12 TABLET | Refills: 0 | Status: SHIPPED | OUTPATIENT
Start: 2023-06-14 | End: 2023-11-21

## 2023-06-14 NOTE — TELEPHONE ENCOUNTER
"Last Written Prescription Date:  3/1/2023  Last Fill Quantity: 12,  # refills: 0   Last office visit provider:  8/1/2022     Requested Prescriptions   Pending Prescriptions Disp Refills     alendronate (FOSAMAX) 70 MG tablet 12 tablet 0     Sig: TAKE 1 TABLET BY MOUTH ONCE WEEKLY ON EMPTY STOMACH WITH WATER AS DIRECTED       Bisphosphonates Passed - 6/14/2023 10:16 AM        Passed - Recent (12 mo) or future (30 days) visit within the authorizing provider's specialty     Patient has had an office visit with the authorizing provider or a provider within the authorizing providers department within the previous 12 mos or has a future within next 30 days. See \"Patient Info\" tab in inbasket, or \"Choose Columns\" in Meds & Orders section of the refill encounter.              Passed - Dexa on file within past 2 years     Please review last Dexa result.           Passed - Medication is active on med list        Passed - Patient is age 18 or older        Passed - Normal serum creatinine on file within past 12 months     Recent Labs   Lab Test 06/06/23  1049   CR 0.56       Ok to refill medication if creatinine is low               Piper Berger RN 06/14/23 10:17 AM  "

## 2023-06-20 ENCOUNTER — TELEPHONE (OUTPATIENT)
Dept: FAMILY MEDICINE | Facility: CLINIC | Age: 68
End: 2023-06-20
Payer: COMMERCIAL

## 2023-06-20 NOTE — TELEPHONE ENCOUNTER
LMTCB      Pt has appt tomorrow with PCP, but PCP is out. There are open slots for next Wednesday, 06/28/23. When Pt calls back, please assist in r/sing. Will send Shahiyahart msg as well.       Ozzy Mendez Jr., CMA on 6/20/2023 at 1:31 PM

## 2023-07-12 ENCOUNTER — OFFICE VISIT (OUTPATIENT)
Dept: FAMILY MEDICINE | Facility: CLINIC | Age: 68
End: 2023-07-12
Payer: COMMERCIAL

## 2023-07-12 VITALS
SYSTOLIC BLOOD PRESSURE: 120 MMHG | HEIGHT: 61 IN | TEMPERATURE: 98.2 F | HEART RATE: 70 BPM | RESPIRATION RATE: 20 BRPM | WEIGHT: 118.9 LBS | OXYGEN SATURATION: 99 % | DIASTOLIC BLOOD PRESSURE: 74 MMHG | BODY MASS INDEX: 22.45 KG/M2

## 2023-07-12 DIAGNOSIS — H26.9 CATARACT, UNSPECIFIED CATARACT TYPE, UNSPECIFIED LATERALITY: ICD-10-CM

## 2023-07-12 DIAGNOSIS — Z01.818 PREOP GENERAL PHYSICAL EXAM: Primary | ICD-10-CM

## 2023-07-12 DIAGNOSIS — F41.9 ANXIETY: ICD-10-CM

## 2023-07-12 DIAGNOSIS — L82.0 INFLAMED SEBORRHEIC KERATOSIS: ICD-10-CM

## 2023-07-12 PROCEDURE — 99214 OFFICE O/P EST MOD 30 MIN: CPT | Performed by: FAMILY MEDICINE

## 2023-07-12 RX ORDER — LORAZEPAM 0.5 MG/1
TABLET ORAL
Qty: 60 TABLET | Refills: 0 | Status: SHIPPED | OUTPATIENT
Start: 2023-07-12

## 2023-07-12 ASSESSMENT — PAIN SCALES - GENERAL: PAINLEVEL: NO PAIN (0)

## 2023-07-12 NOTE — PATIENT INSTRUCTIONS
For informational purposes only. Not to replace the advice of your health care provider. Copyright   2003,  [facility]. All rights reserved. Clinically reviewed by Madeleine Moody MD. [employer] [id number] - REV .  Preparing for Your Surgery  Getting started  A nurse will call you to review your health history and instructions. They will give you an arrival time based on your scheduled surgery time. Please be ready to share:  Your doctor's clinic name and phone number  Your medical, surgical, and anesthesia history  A list of allergies and sensitivities  A list of medicines, including herbal treatments and over-the-counter drugs  Whether the patient has a legal guardian (ask how to send us the papers in advance)  Please tell us if you're pregnant--or if there's any chance you might be pregnant. Some surgeries may injure a fetus (unborn baby), so they require a pregnancy test. Surgeries that are safe for a fetus don't always need a test, and you can choose whether to have one.   If you have a child who's having surgery, please ask for a copy of Preparing for Your Child's Surgery.    Preparing for surgery  Within 10 to 30 days of surgery: Have a pre-op exam (sometimes called an H&P, or History and Physical). This can be done at a clinic or pre-operative center.  If you're having a , you may not need this exam. Talk to your care team.  At your pre-op exam, talk to your care team about all medicines you take. If you need to stop any medicines before surgery, ask when to start taking them again.  We do this for your safety. Many medicines can make you bleed too much during surgery. Some change how well surgery (anesthesia) drugs work.  Call your insurance company to let them know you're having surgery. (If you don't have insurance, call [phone].)  Call your clinic if there's any change in your health. This includes signs of a cold or flu (sore throat, runny nose, cough, rash, fever). It  also includes a scrape or scratch near the surgery site.  If you have questions on the day of surgery, call your hospital or surgery center.  Eating and drinking guidelines  For your safety: Unless your surgeon tells you otherwise, follow the guidelines below.  Eat and drink as usual until 8 hours before you arrive for surgery. After that, no food or milk.  Drink clear liquids until 2 hours before you arrive. These are liquids you can see through, like water, Gatorade, and Propel Water. They also include plain black coffee and tea (no cream or milk), candy, and breath mints. You can spit out gum when you arrive.  If you drink alcohol: Stop drinking it the night before surgery.  If your care team tells you to take medicine on the morning of surgery, it's okay to take it with a sip of water.  Preventing infection  Shower or bathe the night before and morning of your surgery. Follow the instructions your clinic gave you. (If no instructions, use regular soap.)  Don't shave or clip hair near your surgery site. We'll remove the hair if needed.  Don't smoke or vape the morning of surgery. You may chew nicotine gum up to 2 hours before surgery. A nicotine patch is okay.  Note: Some surgeries require you to completely quit smoking and nicotine. Check with your surgeon.  Your care team will make every effort to keep you safe from infection. We will:  Clean our hands often with soap and water (or an alcohol-based hand rub).  Clean the skin at your surgery site with a special soap that kills germs.  Give you a special gown to keep you warm. (Cold raises the risk of infection.)  Wear special hair covers, masks, gowns and gloves during surgery.  Give antibiotic medicine, if prescribed. Not all surgeries need antibiotics.  What to bring on the day of surgery  Photo ID and insurance card  Copy of your health care directive, if you have one  Glasses and hearing aids (bring cases)  You can't wear contacts during surgery  Inhaler and  eye drops, if you use them (tell us about these when you arrive)  CPAP machine or breathing device, if you use them  A few personal items, if spending the night  If you have . . .  A pacemaker, ICD (cardiac defibrillator) or other implant: Bring the ID card.  An implanted stimulator: Bring the remote control.  A legal guardian: Bring a copy of the certified (court-stamped) guardianship papers.  Please remove any jewelry, including body piercings. Leave jewelry and other valuables at home.  If you're going home the day of surgery  You must have a responsible adult drive you home. They should stay with you overnight as well.  If you don't have someone to stay with you, and you aren't safe to go home alone, we may keep you overnight. Insurance often won't pay for this.  After surgery  If it's hard to control your pain or you need more pain medicine, please call your surgeon's office.  Questions?   If you have any questions for your care team, list them here: _________________________________________________________________________________________________________________________________________________________________________ ____________________________________ ____________________________________ ____________________________________    How to Take Your Medication Before Surgery  - Take all of your medications before surgery as usual

## 2023-07-12 NOTE — PROGRESS NOTES
[address] [address]  Phone: [phone]  Fax: [phone]  Primary Provider: Catarina Lopez  Pre-op Performing Provider: CATARINA LOPEZ      PREOPERATIVE EVALUATION:  Today's date: 7/12/2023    Nancy Phillips is a 67 year old female who presents for a preoperative evaluation.      7/12/2023     9:08 AM   Additional Questions   Roomed by MAGALIE Florez   Accompanied by n/a     Surgical Information:  Surgery/Procedure: R Cataract   Surgery Location: [facility]  Surgeon: Dr. Carrion  Surgery Date: 7/13/2023  Time of Surgery: 7:00 am   Where patient plans to recover: At home with family  Fax number for surgical facility: [phone]    Assessment & Plan     The proposed surgical procedure is considered LOW risk.    Preop general physical exam  No absolute contraindications to surgery    Cataract, unspecified cataract type, unspecified laterality  Proceed with surgery, no further testing needed.    Anxiety  Stable and will refill  - LORazepam (ATIVAN) 0.5 MG tablet  Dispense: 60 tablet; Refill: 0    Inflamed Seborrheic Keratosis  Will continue to monitor and if pt would like removed, just call in for an appointment.  If any changes to it's size or coloration, call the office.          - No identified additional risk factors other than previously addressed    Antiplatelet or Anticoagulation Medication Instructions:   - Patient is on no antiplatelet or anticoagulation medications.    Additional Medication Instructions:  Patient is to take all scheduled medications on the day of surgery    RECOMMENDATION:  APPROVAL GIVEN to proceed with proposed procedure, without further diagnostic evaluation.            Subjective       HPI related to upcoming procedure: no isues plan one then 2 weeks later other        7/6/2023     2:14 PM   Preop Questions   1. Have you ever had a heart attack or stroke? No   2. Have you ever had surgery on your  heart or blood vessels, such as a stent placement, a coronary artery bypass, or surgery on an artery in your head, neck, heart, or legs? No   3. Do you have chest pain with activity? No   4. Do you have a history of  heart failure? No   5. Do you currently have a cold, bronchitis or symptoms of other infection? No   6. Do you have a cough, shortness of breath, or wheezing? No   7. Do you or anyone in your family have previous history of blood clots? YES    8. Do you or does anyone in your family have a serious bleeding problem such as prolonged bleeding following surgeries or cuts? No   9. Have you ever had problems with anemia or been told to take iron pills? No   10. Have you had any abnormal blood loss such as black, tarry or bloody stools, or abnormal vaginal bleeding? No   11. Have you ever had a blood transfusion? No   12. Are you willing to have a blood transfusion if it is medically needed before, during, or after your surgery? Yes   13. Have you or any of your relatives ever had problems with anesthesia? No   14. Do you have sleep apnea, excessive snoring or daytime drowsiness? No   15. Do you have any artifical heart valves or other implanted medical devices like a pacemaker, defibrillator, or continuous glucose monitor? No   16. Do you have artificial joints? No   17. Are you allergic to latex? No       Health Care Directive:  Patient does not have a Health Care Directive or Living Will: Patient states has Advance Directive and will bring in a copy to clinic.    Preoperative Review of :   reviewed - no record of controlled substances prescribed.          Review of Systems  CONSTITUTIONAL: NEGATIVE for fever, chills, change in weight  ENT/MOUTH: NEGATIVE for ear, mouth and throat problems  RESP: NEGATIVE for significant cough or SOB  CV: NEGATIVE for chest pain, palpitations or peripheral edema    Patient Active Problem List    Diagnosis Date Noted     Alcohol use 08/01/2022     Priority: Medium      Personal history of nicotine dependence  08/01/2022     Priority: Medium     Asymptomatic menopausal state  08/01/2022     Priority: Medium     Osteoporosis - treatment 2nd round since 2018      Priority: Medium     Created by Conversion  Replacement Utility updated for latest IMO load         Personal history of breast cancer - s/p lumpectomy right 05/07/2018     Priority: Medium      Past Medical History:   Diagnosis Date     Breast cancer (H) 2006     Hx of radiation therapy      Osteoporosis      Past Surgical History:   Procedure Laterality Date     BIOPSY BREAST       LUMPECTOMY BREAST       Current Outpatient Medications   Medication Sig Dispense Refill     alendronate (FOSAMAX) 70 MG tablet TAKE 1 TABLET BY MOUTH ONCE WEEKLY ON EMPTY STOMACH WITH WATER AS DIRECTED 12 tablet 0     b complex vitamins tablet [B COMPLEX VITAMINS TABLET] Take 1 tablet by mouth daily.       calcium carbonate-vitamin D3 (CALCIUM 600 + D,3,) 600 mg(1,500mg) -400 unit per tablet [CALCIUM CARBONATE-VITAMIN D3 (CALCIUM 600 + D,3,) 600 MG(1,500MG) -400 UNIT PER TABLET] Take 1 tablet by mouth daily.       LORazepam (ATIVAN) 0.5 MG tablet [LORAZEPAM (ATIVAN) 0.5 MG TABLET] 1-2 tabs orally once a day as needed  For anxiety 60 tablet 0     lutein 20 mg cap [LUTEIN 20 MG CAP] Take 1 capsule by mouth every other day.       MULTIVITAMIN ORAL [MULTIVITAMIN ORAL] Take 1 tablet by mouth daily.       omega-3/dha/epa/fish oil (FISH OIL-OMEGA-3 FATTY ACIDS) 300-1,000 mg capsule [OMEGA-3/DHA/EPA/FISH OIL (FISH OIL-OMEGA-3 FATTY ACIDS) 300-1,000 MG CAPSULE] Take 2 g by mouth daily.         No Known Allergies     Social History     Tobacco Use     Smoking status: Every Day     Packs/day: 0.25     Years: 45.00     Pack years: 11.25     Types: Cigarettes     Smokeless tobacco: Never   Substance Use Topics     Alcohol use: Yes     Alcohol/week: 6.0 - 8.0 standard drinks of alcohol       History   Drug Use No         Objective     /74 (BP Location:  "Right arm, Patient Position: Sitting, Cuff Size: Adult Regular)   Pulse 70   Temp 98.2  F (36.8  C) (Oral)   Resp 20   Ht 1.549 m (5' 1\")   Wt 53.9 kg (118 lb 14.4 oz)   SpO2 99%   BMI 22.47 kg/m      Physical Exam  GENERAL APPEARANCE: healthy, alert and no distress  HENT: ear canals and TM's normal and nose and mouth without ulcers or lesions  RESP: lungs clear to auscultation - no rales, rhonchi or wheezes  CV: regular rate and rhythm, normal S1 S2, no S3 or S4 and no murmur, click or rub   ABDOMEN: soft, nontender, no HSM or masses and bowel sounds normal  NEURO: Normal strength and tone, sensory exam grossly normal, mentation intact and speech normal    Recent Labs   Lab Test 06/06/23  1049 08/01/22  0930   HGB  --  14.4   NA  --  143   POTASSIUM  --  4.9   CR 0.56 0.58        Diagnostics:  No labs were ordered during this visit.   No EKG required for low risk surgery (cataract, skin procedure, breast biopsy, etc).    Revised Cardiac Risk Index (RCRI):  The patient has the following serious cardiovascular risks for perioperative complications:   - No serious cardiac risks = 0 points     RCRI Interpretation: 0 points: Class I (very low risk - 0.4% complication rate)           Signed Electronically by: Catarina Lopez MD  Copy of this evaluation report is provided to requesting physician.      "

## 2023-09-05 ENCOUNTER — TELEPHONE (OUTPATIENT)
Dept: FAMILY MEDICINE | Facility: CLINIC | Age: 68
End: 2023-09-05
Payer: COMMERCIAL

## 2023-09-05 DIAGNOSIS — Z12.11 SCREENING FOR COLORECTAL CANCER: Primary | ICD-10-CM

## 2023-09-05 DIAGNOSIS — Z12.12 SCREENING FOR COLORECTAL CANCER: Primary | ICD-10-CM

## 2023-09-05 NOTE — TELEPHONE ENCOUNTER
Order/Referral Request    Who is requesting: Patient    Orders being requested: Colonoscopy     Reason service is needed/diagnosis: per patient she is over due    When are orders needed by: as soon as feasible    Has this been discussed with Provider: unknown to writer    Does patient have a preference on a Group/Provider/Facility? [facility]    Does patient have an appointment scheduled?: No    Where to send orders: Place orders within Epic

## 2023-09-14 ENCOUNTER — TRANSFERRED RECORDS (OUTPATIENT)
Dept: HEALTH INFORMATION MANAGEMENT | Facility: CLINIC | Age: 68
End: 2023-09-14
Payer: COMMERCIAL

## 2023-11-04 ENCOUNTER — TRANSFERRED RECORDS (OUTPATIENT)
Dept: HEALTH INFORMATION MANAGEMENT | Facility: CLINIC | Age: 68
End: 2023-11-04
Payer: COMMERCIAL

## 2023-11-21 DIAGNOSIS — M81.0 AGE-RELATED OSTEOPOROSIS WITHOUT CURRENT PATHOLOGICAL FRACTURE: ICD-10-CM

## 2023-11-21 RX ORDER — ALENDRONATE SODIUM 70 MG/1
TABLET ORAL
Qty: 12 TABLET | Refills: 0 | Status: SHIPPED | OUTPATIENT
Start: 2023-11-21 | End: 2024-04-15

## 2023-12-06 ENCOUNTER — HOSPITAL ENCOUNTER (OUTPATIENT)
Dept: BONE DENSITY | Facility: HOSPITAL | Age: 68
Discharge: HOME | End: 2023-12-06
Attending: INTERNAL MEDICINE
Payer: COMMERCIAL

## 2023-12-06 ENCOUNTER — HOSPITAL ENCOUNTER (OUTPATIENT)
Dept: GENERAL RADIOLOGY | Facility: HOSPITAL | Age: 68
Discharge: HOME | End: 2023-12-06
Attending: INTERNAL MEDICINE
Payer: COMMERCIAL

## 2023-12-06 DIAGNOSIS — M81.0 AGE-RELATED OSTEOPOROSIS WITHOUT CURRENT PATHOLOGICAL FRACTURE: ICD-10-CM

## 2023-12-06 PROCEDURE — 72070 X-RAY EXAM THORAC SPINE 2VWS: CPT

## 2023-12-06 PROCEDURE — 72100 X-RAY EXAM L-S SPINE 2/3 VWS: CPT

## 2023-12-06 PROCEDURE — 77080 DXA BONE DENSITY AXIAL: CPT

## 2023-12-11 NOTE — PROGRESS NOTES
"Subjective:    Established patient    Nancy Phillips is a 68 year old female who presents for osteoporosis. The following is a comprehensive summary of her Endocrine care to date.    We previously reviewed the osteoporosis dictation template.     DEXA 6/18/2021:   -Lumbar spine stable compared to 5/2019  -Total mean hip stable compared to 5/2019    DEXA 11/9/2022:  -L1 - L4 T-score -2.7, stable compared to 2021   -Lowest T-score at the hips is -2.2 at the left femoral neck, with \"significant\" -1.8% decline at the total mean hip compared to 6/2021    DEXA 12/6/2023:  -L1 - L4 T-score -2.6 and stable compared to 2022  -Lowest overall T-score at the hips is -2.2 at the left femoral neck and left total hip, total mean hip BMD stable compared to 2022  -TBS T-score: -4.5    Thoracic and lumbar spine XR 12/6/2023: no compression fracture, DJD present, atherosclerosis present    2020: rib fractures and transverse process fractures of L2 - L4 after a fall backwards while on a single step. No other fractures.     Balance is good. No recent falls.     No prior nephrolithiasis. Alcoholism. Active tobacco use.    As of 12/2023 Nancy has completed a complete evaluation for secondary causes of increased fracture risk with the following notable findings:  -No prior hypercalcemia, note labs from 6/6/2023 show uncorrected serum calcium 10.4 mg/dL (ULN 10.2), albumin 4.5 g/dL, and corrected serum calcium 10.0 mg/dL, no prior PTH   -6/2023: 25-OH vitamin D mid normal   -Bone specific alkaline phosphatase middle of premenopausal ref range   -6/2023: 24 hour urine calcium 350 mg (2.2 L collection)     Fosamax:  -Fosamax - 5 year course likely around 2010 - 2015  -Fosamax 70 mg once weekly - resumed in 2018 (unclear when in 2018 but she was on it as of 5/2018) and taken to date, rare missed doses, she takes it appropriately to maximize absorption and minimize the risk of esophagitis      She takes a combination calcium - vitamin D 1 " pill/day. She takes a MVI. She has 1-2 servings of dairy per day.     No other pharmacologic therapy to reduce fracture risk.     No GERD. No recent/upcoming tooth extraction or dental implant.     History of breast cancer in 2006, treated with surgery and radiation. No chemotherapy. Prior lacunar infarcts, no prior MI.      She exercises.     Objective:    Today she is pleasant and conversational.  BMI 22.7 kg/meters squared, blood pressure 118/60.    6/2023: BMI 21.98 kg/m2, /66. No scleral icterus. Dentition reasonable. Mild kyphosis. Radial pulse with a RRR.     Assessment/Plan:    # Osteoporosis    Reviewed that we could either take a drug holiday now from Fosamax or extend the course to 10 years.  We reviewed that she is in a bit of a gray area and either option is reasonable.  She has a preference for a drug holiday, which I think is certainly reasonable.  She will finish her current bottle of Fosamax pills, she has a few months left and then will stop Fosamax.  She should have a repeat DEXA 2 years after she stops Fosamax.    Regarding her hypercalciuria we need to ensure this is not iatrogenic.  We again reviewed optimization of calcium and vitamin D intake in detail and aiming for no more than 1200 mg of calcium intake daily and this can be achieved through either diet alone or diet plus supplementation.  She will make sure she is taking in 1200 mg of calcium daily and not exceeding this.    In January 2024 we will repeat a 24-hour urine calcium collection and PTH with minerals and vitamin D and this is ordered.  If she has persistent hypercalciuria then we will consider a thiazide diuretic.  Next steps when her labs return, no follow-up ordered yet.    We also reviewed limiting alcohol intake and tobacco cessation in detail.  Counseling performed.  She will let me know if she wants referrals to help with cessation.    # Atherosclerosis on imaging    Thoracic and lumbar spine XR 12/6/2023: incidental  finding of atherosclerosis present    We reviewed this today together with her tobacco and alcohol use and she can review considering a statin with her PCP.    20 minutes spent on the date of the encounter doing chart review, history and exam, documentation and further activities as noted above.

## 2023-12-12 ENCOUNTER — OFFICE VISIT (OUTPATIENT)
Dept: ENDOCRINOLOGY | Facility: CLINIC | Age: 68
End: 2023-12-12
Payer: COMMERCIAL

## 2023-12-12 VITALS
WEIGHT: 120 LBS | DIASTOLIC BLOOD PRESSURE: 60 MMHG | HEART RATE: 72 BPM | SYSTOLIC BLOOD PRESSURE: 118 MMHG | BODY MASS INDEX: 22.67 KG/M2

## 2023-12-12 DIAGNOSIS — F10.20 ALCOHOLISM (H): ICD-10-CM

## 2023-12-12 DIAGNOSIS — Z72.0 TOBACCO ABUSE: ICD-10-CM

## 2023-12-12 DIAGNOSIS — R82.994 HYPERCALCIURIA: ICD-10-CM

## 2023-12-12 DIAGNOSIS — M81.0 AGE-RELATED OSTEOPOROSIS WITHOUT CURRENT PATHOLOGICAL FRACTURE: Primary | ICD-10-CM

## 2023-12-12 DIAGNOSIS — Z71.6 ENCOUNTER FOR TOBACCO USE CESSATION COUNSELING: ICD-10-CM

## 2023-12-12 DIAGNOSIS — Z78.0 POSTMENOPAUSAL STATUS: ICD-10-CM

## 2023-12-12 DIAGNOSIS — Z79.83 HISTORY OF ONGOING TREATMENT WITH ALENDRONATE: ICD-10-CM

## 2023-12-12 PROCEDURE — 99213 OFFICE O/P EST LOW 20 MIN: CPT | Performed by: INTERNAL MEDICINE

## 2023-12-12 NOTE — LETTER
"    12/12/2023         RE: Nancy Phillips  [address] [place] [address]        Dear Colleague,    Thank you for referring your patient, Nancy Phillips, to the [facility]. Please see a copy of my visit note below.    Subjective:    Established patient    Nancy Phillips is a 68 year old female who presents for osteoporosis. The following is a comprehensive summary of her Endocrine care to date.    We previously reviewed the osteoporosis dictation template.     DEXA 6/18/2021:   -Lumbar spine stable compared to 5/2019  -Total mean hip stable compared to 5/2019    DEXA 11/9/2022:  -L1 - L4 T-score -2.7, stable compared to 2021   -Lowest T-score at the hips is -2.2 at the left femoral neck, with \"significant\" -1.8% decline at the total mean hip compared to 6/2021    DEXA 12/6/2023:  -L1 - L4 T-score -2.6 and stable compared to 2022  -Lowest overall T-score at the hips is -2.2 at the left femoral neck and left total hip, total mean hip BMD stable compared to 2022  -TBS T-score: -4.5    Thoracic and lumbar spine XR 12/6/2023: no compression fracture, DJD present, atherosclerosis present    2020: rib fractures and transverse process fractures of L2 - L4 after a fall backwards while on a single step. No other fractures.     Balance is good. No recent falls.     No prior nephrolithiasis. Alcoholism. Active tobacco use.    As of 12/2023 Nancy has completed a complete evaluation for secondary causes of increased fracture risk with the following notable findings:  -No prior hypercalcemia, note labs from 6/6/2023 show uncorrected serum calcium 10.4 mg/dL (ULN 10.2), albumin 4.5 g/dL, and corrected serum calcium 10.0 mg/dL, no prior PTH   -6/2023: 25-OH vitamin D mid normal   -Bone specific alkaline phosphatase middle of premenopausal ref range   -6/2023: 24 hour urine calcium 350 mg (2.2 L collection)     Fosamax:  -Fosamax - 5 year course likely around 2010 - 2015  -Fosamax 70 mg once " weekly - resumed in 2018 (unclear when in 2018 but she was on it as of 5/2018) and taken to date, rare missed doses, she takes it appropriately to maximize absorption and minimize the risk of esophagitis      She takes a combination calcium - vitamin D 1 pill/day. She takes a MVI. She has 1-2 servings of dairy per day.     No other pharmacologic therapy to reduce fracture risk.     No GERD. No recent/upcoming tooth extraction or dental implant.     History of breast cancer in 2006, treated with surgery and radiation. No chemotherapy. Prior lacunar infarcts, no prior MI.      She exercises.     Objective:    Today she is pleasant and conversational.  BMI 22.7 kg/meters squared, blood pressure 118/60.    6/2023: BMI 21.98 kg/m2, /66. No scleral icterus. Dentition reasonable. Mild kyphosis. Radial pulse with a RRR.     Assessment/Plan:    # Osteoporosis    Reviewed that we could either take a drug holiday now from Fosamax or extend the course to 10 years.  We reviewed that she is in a bit of a gray area and either option is reasonable.  She has a preference for a drug holiday, which I think is certainly reasonable.  She will finish her current bottle of Fosamax pills, she has a few months left and then will stop Fosamax.  She should have a repeat DEXA 2 years after she stops Fosamax.    Regarding her hypercalciuria we need to ensure this is not iatrogenic.  We again reviewed optimization of calcium and vitamin D intake in detail and aiming for no more than 1200 mg of calcium intake daily and this can be achieved through either diet alone or diet plus supplementation.  She will make sure she is taking in 1200 mg of calcium daily and not exceeding this.    In January 2024 we will repeat a 24-hour urine calcium collection and PTH with minerals and vitamin D and this is ordered.  If she has persistent hypercalciuria then we will consider a thiazide diuretic.  Next steps when her labs return, no follow-up ordered  yet.    We also reviewed limiting alcohol intake and tobacco cessation in detail.  Counseling performed.  She will let me know if she wants referrals to help with cessation.    # Atherosclerosis on imaging    Thoracic and lumbar spine XR 12/6/2023: incidental finding of atherosclerosis present    We reviewed this today together with her tobacco and alcohol use and she can review considering a statin with her PCP.    20 minutes spent on the date of the encounter doing chart review, history and exam, documentation and further activities as noted above.       Again, thank you for allowing me to participate in the care of your patient.        Sincerely,        Mickey Adams MD

## 2024-01-11 ENCOUNTER — LAB (OUTPATIENT)
Dept: LAB | Facility: CLINIC | Age: 69
End: 2024-01-11
Payer: COMMERCIAL

## 2024-01-11 DIAGNOSIS — M81.0 AGE-RELATED OSTEOPOROSIS WITHOUT CURRENT PATHOLOGICAL FRACTURE: ICD-10-CM

## 2024-01-11 DIAGNOSIS — R82.994 HYPERCALCIURIA: ICD-10-CM

## 2024-01-11 LAB
ALBUMIN SERPL BCG-MCNC: 4.4 G/DL (ref 3.5–5.2)
ANION GAP SERPL CALCULATED.3IONS-SCNC: 12 MMOL/L (ref 7–15)
BUN SERPL-MCNC: 17.3 MG/DL (ref 8–23)
CA-I BLD-MCNC: 4.6 MG/DL (ref 4.4–5.2)
CALCIUM SERPL-MCNC: 9.7 MG/DL (ref 8.8–10.2)
CALCIUM SERPL-MCNC: 9.7 MG/DL (ref 8.8–10.2)
CHLORIDE SERPL-SCNC: 104 MMOL/L (ref 98–107)
CREAT SERPL-MCNC: 0.64 MG/DL (ref 0.51–0.95)
DEPRECATED HCO3 PLAS-SCNC: 23 MMOL/L (ref 22–29)
EGFRCR SERPLBLD CKD-EPI 2021: >90 ML/MIN/1.73M2
GLUCOSE SERPL-MCNC: 84 MG/DL (ref 70–99)
PHOSPHATE SERPL-MCNC: 3.5 MG/DL (ref 2.5–4.5)
POTASSIUM SERPL-SCNC: 4.1 MMOL/L (ref 3.4–5.3)
PTH-INTACT SERPL-MCNC: 42 PG/ML (ref 15–65)
SODIUM SERPL-SCNC: 139 MMOL/L (ref 135–145)

## 2024-01-11 PROCEDURE — 83970 ASSAY OF PARATHORMONE: CPT

## 2024-01-11 PROCEDURE — 81050 URINALYSIS VOLUME MEASURE: CPT

## 2024-01-11 PROCEDURE — 82306 VITAMIN D 25 HYDROXY: CPT

## 2024-01-11 PROCEDURE — 80069 RENAL FUNCTION PANEL: CPT

## 2024-01-11 PROCEDURE — 82330 ASSAY OF CALCIUM: CPT

## 2024-01-11 PROCEDURE — 36415 COLL VENOUS BLD VENIPUNCTURE: CPT

## 2024-01-11 PROCEDURE — 82340 ASSAY OF CALCIUM IN URINE: CPT

## 2024-01-17 ENCOUNTER — APPOINTMENT (OUTPATIENT)
Dept: LAB | Facility: CLINIC | Age: 69
End: 2024-01-17
Payer: COMMERCIAL

## 2024-01-17 LAB
CALCIUM 24H UR-MRATE: 0.31 G/SPEC (ref 0.1–0.3)
CALCIUM UR-MCNC: 15.5 MG/DL
COLLECT DURATION TIME UR: 24 H
SPECIMEN VOL UR: 2000 ML

## 2024-01-19 DIAGNOSIS — R82.994 HYPERCALCIURIA: Primary | ICD-10-CM

## 2024-01-19 DIAGNOSIS — R82.994 HYPERCALCIURIA: ICD-10-CM

## 2024-01-19 RX ORDER — HYDROCHLOROTHIAZIDE 12.5 MG/1
12.5 CAPSULE ORAL DAILY
Qty: 60 CAPSULE | Refills: 1 | Status: SHIPPED | OUTPATIENT
Start: 2024-01-19 | End: 2024-05-29

## 2024-01-19 RX ORDER — HYDROCHLOROTHIAZIDE 12.5 MG/1
1 CAPSULE ORAL DAILY
Qty: 90 CAPSULE | OUTPATIENT
Start: 2024-01-19

## 2024-02-05 ENCOUNTER — LAB (OUTPATIENT)
Dept: LAB | Facility: CLINIC | Age: 69
End: 2024-02-05
Payer: COMMERCIAL

## 2024-02-05 DIAGNOSIS — R82.994 HYPERCALCIURIA: ICD-10-CM

## 2024-02-05 LAB
ANION GAP SERPL CALCULATED.3IONS-SCNC: 11 MMOL/L (ref 7–15)
BUN SERPL-MCNC: 15.1 MG/DL (ref 8–23)
CALCIUM SERPL-MCNC: 10 MG/DL (ref 8.8–10.2)
CHLORIDE SERPL-SCNC: 98 MMOL/L (ref 98–107)
CREAT SERPL-MCNC: 0.65 MG/DL (ref 0.51–0.95)
DEPRECATED HCO3 PLAS-SCNC: 30 MMOL/L (ref 22–29)
EGFRCR SERPLBLD CKD-EPI 2021: >90 ML/MIN/1.73M2
GLUCOSE SERPL-MCNC: 88 MG/DL (ref 70–99)
POTASSIUM SERPL-SCNC: 4.2 MMOL/L (ref 3.4–5.3)
SODIUM SERPL-SCNC: 139 MMOL/L (ref 135–145)

## 2024-02-05 PROCEDURE — 80048 BASIC METABOLIC PNL TOTAL CA: CPT

## 2024-02-05 PROCEDURE — 36415 COLL VENOUS BLD VENIPUNCTURE: CPT

## 2024-02-11 DIAGNOSIS — R82.994 HYPERCALCIURIA: Primary | ICD-10-CM

## 2024-02-25 ENCOUNTER — HEALTH MAINTENANCE LETTER (OUTPATIENT)
Age: 69
End: 2024-02-25

## 2024-03-18 ENCOUNTER — LAB (OUTPATIENT)
Dept: LAB | Facility: CLINIC | Age: 69
End: 2024-03-18
Payer: COMMERCIAL

## 2024-03-18 DIAGNOSIS — R82.994 HYPERCALCIURIA: ICD-10-CM

## 2024-03-20 ENCOUNTER — APPOINTMENT (OUTPATIENT)
Dept: LAB | Facility: CLINIC | Age: 69
End: 2024-03-20
Payer: COMMERCIAL

## 2024-03-20 LAB
CALCIUM 24H UR-MRATE: 0.19 G/SPEC (ref 0.1–0.3)
CALCIUM UR-MCNC: 10.1 MG/DL
COLLECT DURATION TIME UR: 24 H
SPECIMEN VOL UR: 1900 ML

## 2024-03-20 PROCEDURE — 81050 URINALYSIS VOLUME MEASURE: CPT

## 2024-03-20 PROCEDURE — 82340 ASSAY OF CALCIUM IN URINE: CPT

## 2024-03-25 DIAGNOSIS — R82.994 HYPERCALCIURIA: Primary | ICD-10-CM

## 2024-03-25 DIAGNOSIS — M81.0 AGE-RELATED OSTEOPOROSIS WITHOUT CURRENT PATHOLOGICAL FRACTURE: ICD-10-CM

## 2024-04-10 SDOH — HEALTH STABILITY: PHYSICAL HEALTH: ON AVERAGE, HOW MANY DAYS PER WEEK DO YOU ENGAGE IN MODERATE TO STRENUOUS EXERCISE (LIKE A BRISK WALK)?: 1 DAY

## 2024-04-10 SDOH — HEALTH STABILITY: PHYSICAL HEALTH: ON AVERAGE, HOW MANY MINUTES DO YOU ENGAGE IN EXERCISE AT THIS LEVEL?: 40 MIN

## 2024-04-10 ASSESSMENT — SOCIAL DETERMINANTS OF HEALTH (SDOH): HOW OFTEN DO YOU GET TOGETHER WITH FRIENDS OR RELATIVES?: ONCE A WEEK

## 2024-04-15 ENCOUNTER — OFFICE VISIT (OUTPATIENT)
Dept: FAMILY MEDICINE | Facility: CLINIC | Age: 69
End: 2024-04-15
Payer: COMMERCIAL

## 2024-04-15 VITALS
HEART RATE: 68 BPM | SYSTOLIC BLOOD PRESSURE: 126 MMHG | WEIGHT: 119 LBS | DIASTOLIC BLOOD PRESSURE: 70 MMHG | BODY MASS INDEX: 21.9 KG/M2 | HEIGHT: 62 IN | TEMPERATURE: 97.9 F | OXYGEN SATURATION: 98 % | RESPIRATION RATE: 18 BRPM

## 2024-04-15 DIAGNOSIS — Z87.891 PERSONAL HISTORY OF TOBACCO USE: ICD-10-CM

## 2024-04-15 DIAGNOSIS — F10.20 ALCOHOLISM (H): ICD-10-CM

## 2024-04-15 DIAGNOSIS — E78.00 ELEVATED CHOLESTEROL: ICD-10-CM

## 2024-04-15 DIAGNOSIS — M81.0 AGE-RELATED OSTEOPOROSIS WITHOUT CURRENT PATHOLOGICAL FRACTURE: ICD-10-CM

## 2024-04-15 DIAGNOSIS — R82.994 HYPERCALCIURIA: ICD-10-CM

## 2024-04-15 DIAGNOSIS — Z00.00 ENCOUNTER FOR MEDICARE ANNUAL WELLNESS EXAM: Primary | ICD-10-CM

## 2024-04-15 DIAGNOSIS — I70.90 ATHEROSCLEROTIC VASCULAR DISEASE: ICD-10-CM

## 2024-04-15 DIAGNOSIS — L82.1 SEBORRHEIC KERATOSIS: ICD-10-CM

## 2024-04-15 DIAGNOSIS — L98.9 SKIN LESION: ICD-10-CM

## 2024-04-15 LAB
ATRIAL RATE - MUSE: 62 BPM
DIASTOLIC BLOOD PRESSURE - MUSE: NORMAL MMHG
INTERPRETATION ECG - MUSE: NORMAL
P AXIS - MUSE: 18 DEGREES
PR INTERVAL - MUSE: 168 MS
QRS DURATION - MUSE: 88 MS
QT - MUSE: 452 MS
QTC - MUSE: 458 MS
R AXIS - MUSE: -13 DEGREES
SYSTOLIC BLOOD PRESSURE - MUSE: NORMAL MMHG
T AXIS - MUSE: 45 DEGREES
VENTRICULAR RATE- MUSE: 62 BPM

## 2024-04-15 PROCEDURE — G0405 EKG INTERPRET & REPORT PREVE: HCPCS | Performed by: INTERNAL MEDICINE

## 2024-04-15 PROCEDURE — 80061 LIPID PANEL: CPT

## 2024-04-15 PROCEDURE — 80076 HEPATIC FUNCTION PANEL: CPT

## 2024-04-15 PROCEDURE — 36415 COLL VENOUS BLD VENIPUNCTURE: CPT

## 2024-04-15 PROCEDURE — G0403 EKG FOR INITIAL PREVENT EXAM: HCPCS

## 2024-04-15 PROCEDURE — 99406 BEHAV CHNG SMOKING 3-10 MIN: CPT

## 2024-04-15 PROCEDURE — 99214 OFFICE O/P EST MOD 30 MIN: CPT | Mod: 25

## 2024-04-15 PROCEDURE — G0402 INITIAL PREVENTIVE EXAM: HCPCS

## 2024-04-15 PROCEDURE — G0296 VISIT TO DETERM LDCT ELIG: HCPCS

## 2024-04-15 ASSESSMENT — ENCOUNTER SYMPTOMS
DIZZINESS: 0
ARTHRALGIAS: 1
CONSTIPATION: 0
SHORTNESS OF BREATH: 0
COUGH: 0
HEADACHES: 0
MYALGIAS: 0
PALPITATIONS: 0
BLOOD IN STOOL: 0
DIARRHEA: 0
WEAKNESS: 0

## 2024-04-15 ASSESSMENT — PAIN SCALES - GENERAL: PAINLEVEL: NO PAIN (0)

## 2024-04-15 NOTE — PROGRESS NOTES
Preventive Care Visit  [facility]  Milind Chowdary, REJI CNP, Family Medicine  Apr 15, 2024      Assessment & Plan     Encounter for Medicare annual wellness exam  On exam, healthy 68 year old patient. No acute or concerning findings on today's physical exam. Vital signs at goal.  EKG completed. Vision screen completed today. No falls over the past year. Mood stable. No concerns with memory.   - EKG 12-lead, tracing only    Personal history of tobacco use  Patient smoking about 0.25 pack per day of cigarettes. 49.8 pack year history. Discussed harms to health today, patient has no desire to quit at this time. It is great that she has decreased amount per day. Risk versus benefit discussion on CT lung cancer screening. Patient would like to complete this after discussion. Ordered. Around 3 minutes spent talking about smoking cessation, but patient again is not ready to quit.   - SMOKING CESSATION COUNSELING 3-10 MIN  - Prof fee: Shared Decision Making for Lung Cancer Screening  - CT Chest Lung Cancer Scrn Low Dose wo; Future    Lung Cancer Screening Shared Decision Making Visit     Nancy Phillips, a 68 year old female, is eligible for lung cancer screening    History   Smoking Status    Every Day    Types: Cigarettes   Smokeless Tobacco    Never       I have discussed with patient the risks and benefits of screening for lung cancer with low-dose CT.     The risks include:    radiation exposure: one low dose chest CT has as much ionizing radiation as about 15 chest x-rays, or 6 months of background radiation living in [place]      false positives: most findings/nodules are NOT cancer, but some might still require additional diagnostic evaluation, including biopsy    over-diagnosis: some slow growing cancers that might never have been clinically significant will be detected and treated unnecessarily     The benefit of early detection of lung cancer is contingent upon adherence to annual  screening or more frequent follow up if indicated.     Furthermore, to benefit from screening, Nancy must be willing and able to undergo diagnostic procedures, if indicated. Although no specific guide is available for determining severity of comorbidities, it is reasonable to withhold screening in patients who have greater mortality risk from other diseases.     We did discuss that the best way to prevent lung cancer is to not smoke.    Some patients may value a numeric estimation of lung cancer risk when evaluating if lung cancer screening is right for them, here is one calculator:    ShouldIScreen    Skin lesion  Seborrheic keratosis  Slow-growing, tan lesion that has stuck on appearance to the right forehead. Consistent with seborrheic keratosis. Based on size and location recommend dermatology for removal. Patient agrees.   - Adult Dermatology  Referral; Future    Elevated cholesterol  Atherosclerotic vascular disease  Elevated total cholesterol previously. Repeat today. Has had goal LDL and high HDL. However, atherosclerosis on thoracic/lumbar spine XR as incidental finding. Will get cholesterol repeat today and discuss statin use for secondary prevention with atherosclerosis.    Lipid panel reflex to direct LDL Fasting    Age-related osteoporosis without current pathological fracture  Hypercalciuria: resolved on hydrochlorothiazide   Following with endocrinology. Not on any therapies. Previously was on Fosamax, on a drug holiday at this time. Repeat DEXA two years after stopping. Is on hydrochlorothiazide due to elevated calcium levels. Most recent check revelad normalized urine calcium. Recommend staying on the hydrochlorothiazide. Repeat urine calcium in one year. Patient is aware of these recommendations. No questions or concerns.     Alcoholism (H)  Patient reports 2 nati drinks in the evening. Discussed health risks with this and that it is above recommended daily intake. Patient verbalized  understanding. No desire to quit at this time. Will check hepatic panel with labs.   - Hepatic panel (Albumin, ALT, AST, Bili, Alk Phos, TP)     Nicotine/Tobacco Cessation  She reports that she has been smoking cigarettes. She started smoking about 49 years ago. She has a 23.7 pack-year smoking history. She has been exposed to tobacco smoke. She has never used smokeless tobacco.  Nicotine/Tobacco Cessation Plan  Information offered: Patient not interested at this time      Counseling  Appropriate preventive services were discussed with this patient, including applicable screening as appropriate for fall prevention, nutrition, physical activity, Tobacco-use cessation, weight loss and cognition.  Checklist reviewing preventive services available has been given to the patient.  Reviewed patient's diet, addressing concerns and/or questions.   She is at risk for lack of exercise and has been provided with information to increase physical activity for the benefit of her well-being.   The patient reports drinking more than one alcoholic drink per day and sometimes engages in binge or excessive drinking. The patient was counseled and given information about possible harmful effects of excessive alcohol intake as well as where to get help for alcohol problems.     Nedra Cruz is a 68 year old, presenting for the following:  Physical        4/15/2024     9:53 AM   Additional Questions   Roomed by andi     Health Care Directive  Patient does not have a Health Care Directive or Living Will: Discussed advance care planning with patient; information given to patient to review.    HPI    Stopped fosamax. Following with Dr. Adams. He started her on hydrochlorothiazide for calcium in her urine. This improved and now she is good for a year, repeat 24 hour urine in a year. She follows with Bryan.     Hydrochlorothiazide due to calcium in the urine. Plan is to stay on this.     Lorazepam, not often. Takes it for  anxiety around a road trip. Has some left from last fill. Does not take more than on trips.     Breast cancer when 50. Still doing mammograms. Following with oncology. Did a lumpectomy for treatment.     Smoking about 0.25 per day. Not trying to decrease. First cigarette around 3:30. No desire to quit further.     Drink everyday. Francesca with water. 4 per day. Later in the day. Does not want to decrease this.     Due for colonoscopy in 11/2026    Questions about mole on the forehead. Noticed it a while back. Not painful. No growing or getting larger.         4/10/2024   General Health   How would you rate your overall physical health? Good   Feel stress (tense, anxious, or unable to sleep) Not at all         4/10/2024   Nutrition   Diet: Regular (no restrictions)         4/10/2024   Exercise   Days per week of moderate/strenous exercise 1 day   Average minutes spent exercising at this level 40 min   (!) EXERCISE CONCERN      4/10/2024   Social Factors   Frequency of gathering with friends or relatives Once a week   Worry food won't last until get money to buy more No   Food not last or not have enough money for food? No   Do you have housing?  Yes   Are you worried about losing your housing? No   Lack of transportation? No   Unable to get utilities (heat,electricity)? No         4/10/2024   Fall Risk   Fallen 2 or more times in the past year? No   Trouble with walking or balance? No          4/10/2024   Activities of Daily Living- Home Safety   Needs help with the following daily activites None of the above   Safety concerns in the home None of the above         4/10/2024   Dental   Dentist two times every year? Yes         4/10/2024   Hearing Screening   Hearing concerns? None of the above         4/10/2024   Driving Risk Screening   Patient/family members have concerns about driving No         4/10/2024   General Alertness/Fatigue Screening   Have you been more tired than usual lately? No         4/10/2024    Urinary Incontinence Screening   Bothered by leaking urine in past 6 months No         4/10/2024   TB Screening   Were you born outside of the [place]? No         Today's PHQ-2 Score:       4/15/2024     9:45 AM   PHQ-2 ( 1999 [employer])   Q1: Little interest or pleasure in doing things 0   Q2: Feeling down, depressed or hopeless 0   PHQ-2 Score 0   Q1: Little interest or pleasure in doing things Not at all   Q2: Feeling down, depressed or hopeless Not at all   PHQ-2 Score 0           4/10/2024   Substance Use   Alcohol more than 3/day or more than 7/wk Yes   How often do you have a drink containing alcohol 4 or more times a week   How many alcohol drinks on typical day 3 or 4   How often do you have 5+ drinks at one occasion Never   Audit 2/3 Score 1   How often not able to stop drinking once started Never   How often failed to do what normally expected Never   How often needed first drink in am after a heavy drinking session Never   How often feeling of guilt or remorse after drinking Never   How often unable to remember what happened the night before Never   Have you or someone else been injured because of your drinking Yes, but not in the last year   Has anyone been concerned or suggested you cut down on drinking Yes, during the last year   TOTAL SCORE - AUDIT 11   Do you have a current opioid prescription? No   How severe/bad is pain from 1 to 10? 1/10   Do you use any other substances recreationally? (!) SYNTHETIC MARIJUANA     Social History     Tobacco Use    Smoking status: Every Day     Current packs/day: 0.25     Average packs/day: 0.3 packs/day for 45.0 years (11.3 ttl pk-yrs)     Types: Cigarettes     Passive exposure: Current    Smokeless tobacco: Never   Vaping Use    Vaping status: Never Used    Passive vaping exposure: Yes   Substance Use Topics    Alcohol use: Yes     Alcohol/week: 6.0 - 8.0 standard drinks of alcohol    Drug use: No           7/6/2023   LAST FHS-7 RESULTS   1st degree relative breast or  ovarian cancer No   Any relative bilateral breast cancer No   Any male have breast cancer No   Any ONE woman have BOTH breast AND ovarian cancer No   Any woman with breast cancer before 50yrs No   2 or more relatives with breast AND/OR ovarian cancer No   2 or more relatives with breast AND/OR bowel cancer No     Mammogram Screening - Annual screen due to history of breast cancer, carcinoma in situ, or hyperplasia    ASCVD Risk   The ASCVD Risk score (Mandeep DK, et al., 2019) failed to calculate for the following reasons:    The valid HDL cholesterol range is 20 to 100 mg/dL    Reviewed and updated as needed this visit by Provider     Meds   Med Hx  Surg Hx  Fam Hx            Current providers sharing in care for this patient include:  Patient Care Team:  Catarina Lopez MD as PCP - General  Mickey Adams MD as Assigned Endocrinology Provider  Catarina Lopez MD as Assigned PCP    The following health maintenance items are reviewed in Epic and correct as of today:  Health Maintenance   Topic Date Due    ANNUAL REVIEW OF HM ORDERS  Never done    ZOSTER IMMUNIZATION (1 of 2) Never done    RSV VACCINE (Pregnancy & 60+) (1 - 1-dose 60+ series) Never done    LUNG CANCER SCREENING  09/04/2021    MEDICARE ANNUAL WELLNESS VISIT  08/31/2022    NICOTINE/TOBACCO CESSATION COUNSELING Q 1 YR  08/01/2023    MAMMO SCREENING  09/14/2024    FALL RISK ASSESSMENT  04/15/2025    ADVANCE CARE PLANNING  05/26/2026    COLORECTAL CANCER SCREENING  11/04/2026    GLUCOSE  02/05/2027    LIPID  08/01/2027    DTAP/TDAP/TD IMMUNIZATION (2 - Td or Tdap) 05/07/2028    DEXA  12/06/2038    PHQ-2 (once per calendar year)  Completed    INFLUENZA VACCINE  Completed    Pneumococcal Vaccine: 65+ Years  Completed    COVID-19 Vaccine  Completed    IPV IMMUNIZATION  Aged Out    HPV IMMUNIZATION  Aged Out    MENINGITIS IMMUNIZATION  Aged Out    RSV MONOCLONAL ANTIBODY  Aged Out    HEPATITIS C SCREENING  Discontinued    PAP  Discontinued  "      Review of Systems   Respiratory:  Negative for cough and shortness of breath.    Cardiovascular:  Negative for chest pain, palpitations and leg swelling.   Gastrointestinal:  Negative for blood in stool, constipation and diarrhea.   Genitourinary:  Negative for vaginal bleeding.   Musculoskeletal:  Positive for arthralgias. Negative for myalgias.   Skin:         Mole of concern.    Neurological:  Negative for dizziness, weakness and headaches.        Objective    Exam  /70 (BP Location: Right arm, Patient Position: Sitting)   Pulse 68   Temp 97.9  F (36.6  C) (Oral)   Resp 18   Ht 1.57 m (5' 1.81\")   Wt 54 kg (119 lb)   SpO2 98%   BMI 21.90 kg/m     Estimated body mass index is 21.9 kg/m  as calculated from the following:    Height as of this encounter: 1.57 m (5' 1.81\").    Weight as of this encounter: 54 kg (119 lb).    Physical Exam  GENERAL: alert and no distress  EYES: Eyes grossly normal to inspection, PERRL and conjunctivae and sclerae normal  HENT: ear canals and TM's normal, nose and mouth without ulcers or lesions  NECK: no adenopathy, no asymmetry, masses, or scars  RESP: lungs clear to auscultation - no rales, rhonchi or wheezes  CV: regular rate and rhythm, normal S1 S2, no S3 or S4, no murmur, click or rub, no peripheral edema  ABDOMEN: soft, nontender, no hepatosplenomegaly, no masses and bowel sounds normal  MS: no gross musculoskeletal defects noted, no edema  SKIN: no suspicious lesions or rashes. Flesh colored, stuck-on lesion on the forehead.   NEURO: Normal strength and tone, mentation intact and speech normal  PSYCH: mentation appears normal, affect normal/bright        4/15/2024   Mini Cog   Clock Draw Score 2 Normal   3 Item Recall 3 objects recalled   Mini Cog Total Score 5         4/15/2024   Vision Screen   Patient wears corrective lenses (select all that apply) NOT worn during vision screen;Comments   Vision Screen Results Pass     At the end of the visit, I confirmed " understanding of what was discussed. Nancy has no further questions or concerns that were brought up at this time.     20 minutes spent in addition to the preventative visit by me on the date of the encounter doing chart review, history and exam, documentation and further activities per the note     Milind Chowdary DNP, APRN, FNP-C

## 2024-04-15 NOTE — PATIENT INSTRUCTIONS
Lung Cancer Screening   Frequently Asked Questions  If you are at high-risk for lung cancer, getting screened with low-dose computed tomography (LDCT) every year can help save your life. This handout offers answers to some of the most common questions about lung cancer screening. If you have other questions, please call [phone] ([phone]).     What is it?  Lung cancer screening uses special X-ray technology to create an image of your lung tissue. The exam is quick and easy and takes less than 10 seconds. We don t give you any medicine or use any needles. You can eat before and after the exam. You don t need to change your clothes as long as the clothing on your chest doesn t contain metal. But, you do need to be able to hold your breath for at least 6 seconds during the exam.    What is the goal of lung cancer screening?  The goal of lung cancer screening is to save lives. Many times, lung cancer is not found until a person starts having physical symptoms. Lung cancer screening can help detect lung cancer in the earliest stages when it may be easier to treat.    Who should be screened for lung cancer?  We suggest lung cancer screening for anyone who is at high-risk for lung cancer. You are in the high-risk group if you:      are between the ages of 55 and 79, and    have smoked at least 1 pack of cigarettes a day for 20 or more years, and    still smoke or have quit within the past 15 years.    However, if you have a new cough or shortness of breath, you should talk to your doctor before being screened.    Why does it matter if I have symptoms?  Certain symptoms can be a sign that you have a condition in your lungs that should be checked and treated by your doctor. These symptoms include fever, chest pain, a new or changing cough, shortness of breath that you have never felt before, coughing up blood or unexplained weight loss. Having any of these symptoms can greatly affect the results of lung  cancer screening.       Should all smokers get an LDCT lung cancer screening exam?  It depends. Lung cancer screening is for a very specific group of men and women who have a history of heavy smoking over a long period of time (see  Who should be screened for lung cancer  above).  I am in the high-risk group, but have been diagnosed with cancer in the past. Is LDCT lung cancer screening right for me?  In some cases, you should not have LDCT lung screening, such as when your doctor is already following your cancer with CT scan studies. Your doctor will help you decide if LDCT lung screening is right for you.  Do I need to have a screening exam every year?  Yes. If you are in the high-risk group described earlier, you should get an LDCT lung cancer screening exam every year until you are 79, or are no longer willing or able to undergo screening and possible procedures to diagnose and treat lung cancer.  How effective is LDCT at preventing death from lung cancer?  Studies have shown that LDCT lung cancer screening can lower the risk of death from lung cancer by 20 percent in people who are at high-risk.  What are the risks?  There are some risks and limitations of LDCT lung cancer screening. We want to make sure you understand the risks and benefits, so please let us know if you have any questions. Your doctor may want to talk with you more about these risks.    Radiation exposure: As with any exam that uses radiation, there is a very small increased risk of cancer. The amount of radiation in LDCT is small--about the same amount a person would get from a mammogram. Your doctor orders the exam when he or she feels the potential benefits outweigh the risks.    False negatives: No test is perfect, including LDCT. It is possible that you may have a medical condition, including lung cancer, that is not found during your exam. This is called a false negative result.    False positives and more testing: LDCT very often finds  something in the lung that could be cancer, but in fact is not. This is called a false positive result. False positive tests often cause anxiety. To make sure these findings are not cancer, you may need to have more tests. These tests will be done only if you give us permission. Sometimes patients need a treatment that can have side effects, such as a biopsy. For more information on false positives, see  What can I expect from the results?     Findings not related to lung cancer: Your LDCT exam also takes pictures of areas of your body next to your lungs. In a very small number of cases, the CT scan will show an abnormal finding in one of these areas, such as your kidneys, adrenal glands, liver or thyroid. This finding may not be serious, but you may need more tests. Your doctor can help you decide what other tests you may need, if any.  What can I expect from the results?  About 1 out of 4 LDCT exams will find something that may need more tests. Most of the time, these findings are lung nodules. Lung nodules are very small collections of tissue in the lung. These nodules are very common, and the vast majority--more than 97 percent--are not cancer (benign). Most are normal lymph nodes or small areas of scarring from past infections.  But, if a small lung nodule is found to be cancer, the cancer can be cured more than 90 percent of the time. To know if the nodule is cancer, we may need to get more images before your next yearly screening exam. If the nodule has suspicious features (for example, it is large, has an odd shape or grows over time), we will refer you to a specialist for further testing.  Will my doctor also get the results?  Yes. Your doctor will get a copy of your results.  Is it okay to keep smoking now that there s a cancer screening exam?  No. Tobacco is one of the strongest cancer-causing agents. It causes not only lung cancer, but other cancers and cardiovascular (heart) diseases as well. The damage  caused by smoking builds over time. This means that the longer you smoke, the higher your risk of disease. While it is never too late to quit, the sooner you quit, the better.  Where can I find help to quit smoking?  The best way to prevent lung cancer is to stop smoking. If you have already quit smoking, congratulations and keep it up! For help on quitting smoking, please call [employer] at 2-520-QUITNOW ([phone]) or the American [employer] Society at 1-643.783.8147 to find local resources near you.  One-on-one health coaching:  If you d prefer to work individually with a health care provider on tobacco cessation, we offer:      Medication Therapy Management:  Our specially trained pharmacists work closely with you and your doctor to help you quit smoking.  Call [phone] or [phone] (toll free).    Preventive Care Advice   This is general advice given by our system to help you stay healthy. However, your care team may have specific advice just for you. Please talk to your care team about your preventive care needs.  Nutrition  Eat 5 or more servings of fruits and vegetables each day.  Try wheat bread, brown rice and whole grain pasta (instead of white bread, rice, and pasta).  Get enough calcium and vitamin D. Check the label on foods and aim for 100% of the RDA (recommended daily allowance).  Lifestyle  Exercise at least 150 minutes each week   (30 minutes a day, 5 days a week).  Do muscle strengthening activities 2 days a week. These help control your weight and prevent disease.  No smoking.  Wear sunscreen to prevent skin cancer.  Have a dental exam and cleaning every 6 months.  Yearly exams  See your health care team every year to talk about:  Any changes in your health.  Any medicines your care team has prescribed.  Preventive care, family planning, and ways to prevent chronic diseases.  Shots (vaccines)   HPV shots (up to age 26), if you've never had them before.  Hepatitis B shots (up to age  59), if you've never had them before.  COVID-19 shot: Get this shot when it's due.  Flu shot: Get a flu shot every year.  Tetanus shot: Get a tetanus shot every 10 years.  Pneumococcal, hepatitis A, and RSV shots: Ask your care team if you need these based on your risk.  Shingles shot (for age 50 and up).  General health tests  Diabetes screening:  Starting at age 35, Get screened for diabetes at least every 3 years.  If you are younger than age 35, ask your care team if you should be screened for diabetes.  Cholesterol test: At age 39, start having a cholesterol test every 5 years, or more often if advised.  Bone density scan (DEXA): At age 50, ask your care team if you should have this scan for osteoporosis (brittle bones).  Hepatitis C: Get tested at least once in your life.  STIs (sexually transmitted infections)  Before age 24: Ask your care team if you should be screened for STIs.  After age 24: Get screened for STIs if you're at risk. You are at risk for STIs (including HIV) if:  You are sexually active with more than one person.  You don't use condoms every time.  You or a partner was diagnosed with a sexually transmitted infection.  If you are at risk for HIV, ask about PrEP medicine to prevent HIV.  Get tested for HIV at least once in your life, whether you are at risk for HIV or not.  Cancer screening tests  Cervical cancer screening: If you have a cervix, begin getting regular cervical cancer screening tests at age 21. Most people who have regular screenings with normal results can stop after age 65. Talk about this with your provider.  Breast cancer scan (mammogram): If you've ever had breasts, begin having regular mammograms starting at age 40. This is a scan to check for breast cancer.  Colon cancer screening: It is important to start screening for colon cancer at age 45.  Have a colonoscopy test every 10 years (or more often if you're at risk) Or, ask your provider about stool tests like a FIT test  every year or Cologuard test every 3 years.  To learn more about your testing options, visit: [email or web address]/[email or web address].  For help making a decision, visit: https://bit.ly/pj61167.  Prostate cancer screening test: If you have a prostate and are age 55 to 69, ask your provider if you would benefit from a yearly prostate cancer screening test.  Lung cancer screening: If you are a current or former smoker age 50 to 80, ask your care team if ongoing lung cancer screenings are right for you.  For informational purposes only. Not to replace the advice of your health care provider. Copyright   2023 [facility]. All rights reserved. Clinically reviewed by the [facility]. [employer] 718124 - REV 01/24.    Learning About Alcohol Use Disorder  What is alcohol use disorder?  Alcohol use disorder means that a person drinks alcohol even though it causes harm to themselves or others. It can range from mild to severe. The more symptoms of this disorder you have, the more severe it may be. People who have it may find it hard to control their use of alcohol.  People who have this disorder may argue with others about how much they're drinking. Their job may be affected because of drinking. They may drink when it's dangerous or illegal, such as when they drive. They also may have a strong need, or craving, to drink. They may feel like they must drink just to get by. Their drinking may increase their risk of getting hurt or being in a car crash.  Over time, drinking too much alcohol may cause health problems. These may include high blood pressure, liver problems, or problems with digestion.  What are the symptoms?  Maybe you've wondered about your alcohol habits or how to tell if your drinking is becoming a problem.  Here are some of the symptoms of alcohol use disorder. You may have it if you have two or more of the following symptoms:  You drink larger amounts of alcohol than you ever  meant to. Or you've been drinking for a longer time than you ever meant to.  You can't cut down or control your use. Or you constantly wish you could cut down.  You spend a lot of time getting or drinking alcohol or recovering from its effects.  You have strong cravings for alcohol.  You can no longer do your main jobs at work, at school, or at home.  You keep drinking alcohol, even though your use hurts your relationships.  You have stopped doing important activities because of your alcohol use.  You drink alcohol in situations where doing so is dangerous.  You keep drinking alcohol even though you know it's causing health problems.  You need more and more alcohol to get the same effect, or you get less effect from the same amount over time. This is called tolerance.  You have uncomfortable symptoms when you stop drinking alcohol or use less. This is called withdrawal.  Alcohol use disorder can range from mild to severe. The more symptoms you have, the more severe the disorder may be.  You might not realize that your drinking is a problem. You might not drink large amounts when you drink. Or you might go for days or weeks between drinking episodes. But even if you don't drink very often, your drinking could still be harmful and put you at risk.  How is alcohol use disorder treated?  Getting help is up to you. But you don't have to do it alone. There are many people and kinds of treatments that can help.  Treatment for alcohol use disorder can include:  Group therapy, one or more types of counseling, and alcohol education.  Medicines that help to:  Reduce withdrawal symptoms and help you safely stop drinking.  Reduce cravings for alcohol.  Support groups. These groups include Alcoholics Anonymous and [employer] (Self-Management and Recovery Training).  Some people are able to stop or cut back on drinking with help from a counselor. People who have moderate to severe alcohol use disorder may need medical  "treatment. They may need to stay in a hospital or treatment center.  You may have a treatment team to help you. This team may include a psychologist or psychiatrist, counselors, doctors, social workers, nurses, and a . A  helps plan and manage your treatment.  Follow-up care is a key part of your treatment and safety. Be sure to make and go to all appointments, and call your doctor if you are having problems. It's also a good idea to know your test results and keep a list of the medicines you take.  Where can you learn more?  Go to https://www.[employer].net/patiented  Enter H758 in the search box to learn more about \"Learning About Alcohol Use Disorder.\"  Current as of: November 15, 2023               Content Version: 14.0    0510-7435 [employer].   Care instructions adapted under license by your healthcare professional. If you have questions about a medical condition or this instruction, always ask your healthcare professional. [employer] disclaims any warranty or liability for your use of this information.      Substance Use Disorder: Care Instructions  Overview     You can improve your life and health by stopping your use of alcohol or drugs. When you don't drink or use drugs, you may feel and sleep better. You may get along better with your family, friends, and coworkers. There are medicines and programs that can help with substance use disorder.  How can you care for yourself at home?  Here are some ways to help you stay sober and prevent relapse.  If you have been given medicine to help keep you sober or reduce your cravings, be sure to take it exactly as prescribed.  Talk to your doctor about programs that can help you stop using drugs or drinking alcohol.  Do not keep alcohol or drugs in your home.  Plan ahead. Think about what you'll say if other people ask you to drink or use drugs. Try not to spend time with people who drink or use drugs.  Use the time " and money spent on drinking or drugs to do something that's important to you.  Preventing a relapse  Have a plan to deal with relapse. Learn to recognize changes in your thinking that lead you to drink or use drugs. Get help before you start to drink or use drugs again.  Try to stay away from situations, friends, or places that may lead you to drink or use drugs.  If you feel the need to drink alcohol or use drugs again, seek help right away. Call a trusted friend or family member. Some people get support from organizations such as Narcotics Anonymous or [employer] or from treatment facilities.  If you relapse, get help as soon as you can. Some people make a plan with another person that outlines what they want that person to do for them if they relapse. The plan usually includes how to handle the relapse and who to notify in case of relapse.  Don't give up. Remember that a relapse doesn't mean that you have failed. Use the experience to learn the triggers that lead you to drink or use drugs. Then quit again. Recovery is a lifelong process. Many people have several relapses before they are able to quit for good.  Follow-up care is a key part of your treatment and safety. Be sure to make and go to all appointments, and call your doctor if you are having problems. It's also a good idea to know your test results and keep a list of the medicines you take.  When should you call for help?   Call 911  anytime you think you may need emergency care. For example, call if you or someone else:    Has overdosed or has withdrawal signs. Be sure to tell the emergency workers that you are or someone else is using or trying to quit using drugs. Overdose or withdrawal signs may include:  Losing consciousness.  Seizure.  Seeing or hearing things that aren't there (hallucinations).     Is thinking or talking about suicide or harming others.   Where to get help 24 hours a day, 7 days a week   If you or someone you know talks about  "suicide, self-harm, a mental health crisis, a substance use crisis, or any other kind of emotional distress, get help right away. You can:    Call the Suicide and Crisis Lifeline at 988.     Call 5-405-931-[facility] (1-122.166.1168).     Text HOME to 141896 to access the Crisis Text Line.   Consider saving these numbers in your phone.  Go to [employer] for more information or to chat online.  Call your doctor now or seek immediate medical care if:    You are having withdrawal symptoms. These may include nausea or vomiting, sweating, shakiness, and anxiety.   Watch closely for changes in your health, and be sure to contact your doctor if:    You have a relapse.     You need more help or support to stop.   Where can you learn more?  Go to https://www.[employer].net/patiented  Enter H573 in the search box to learn more about \"Substance Use Disorder: Care Instructions.\"  Current as of: November 15, 2023               Content Version: 14.0    4235-8360 [employer].   Care instructions adapted under license by your healthcare professional. If you have questions about a medical condition or this instruction, always ask your healthcare professional. [employer] disclaims any warranty or liability for your use of this information.      "

## 2024-04-16 LAB
ALBUMIN SERPL BCG-MCNC: 4.7 G/DL (ref 3.5–5.2)
ALP SERPL-CCNC: 82 U/L (ref 40–150)
ALT SERPL W P-5'-P-CCNC: 26 U/L (ref 0–50)
AST SERPL W P-5'-P-CCNC: 27 U/L (ref 0–45)
BILIRUB DIRECT SERPL-MCNC: <0.2 MG/DL (ref 0–0.3)
BILIRUB SERPL-MCNC: 0.5 MG/DL
CHOLEST SERPL-MCNC: 234 MG/DL
FASTING STATUS PATIENT QL REPORTED: ABNORMAL
HDLC SERPL-MCNC: 114 MG/DL
LDLC SERPL CALC-MCNC: 104 MG/DL
NONHDLC SERPL-MCNC: 120 MG/DL
PROT SERPL-MCNC: 7.6 G/DL (ref 6.4–8.3)
TRIGL SERPL-MCNC: 82 MG/DL

## 2024-05-02 ENCOUNTER — HOSPITAL ENCOUNTER (OUTPATIENT)
Dept: CT IMAGING | Facility: HOSPITAL | Age: 69
Discharge: HOME | End: 2024-05-02
Payer: COMMERCIAL

## 2024-05-02 DIAGNOSIS — Z87.891 PERSONAL HISTORY OF TOBACCO USE: ICD-10-CM

## 2024-05-02 PROCEDURE — 71271 CT THORAX LUNG CANCER SCR C-: CPT

## 2024-05-29 ENCOUNTER — TELEPHONE (OUTPATIENT)
Dept: ENDOCRINOLOGY | Facility: CLINIC | Age: 69
End: 2024-05-29
Payer: COMMERCIAL

## 2024-05-29 DIAGNOSIS — R82.994 HYPERCALCIURIA: ICD-10-CM

## 2024-05-29 RX ORDER — HYDROCHLOROTHIAZIDE 12.5 MG/1
1 CAPSULE ORAL DAILY
Qty: 90 CAPSULE | Refills: 0 | Status: SHIPPED | OUTPATIENT
Start: 2024-05-29 | End: 2024-09-03

## 2024-05-29 NOTE — TELEPHONE ENCOUNTER
05[facility]29[facility] Pt needs follow up February march 2025- please offer to schedule as provider is booking out that far.

## 2024-05-29 NOTE — TELEPHONE ENCOUNTER
Pt needs follow up February march 2025- please offer to schedule as provider is booking out that far.     Requested Prescriptions   Pending Prescriptions Disp Refills    hydrochlorothiazide (MICROZIDE) 12.5 MG capsule [Pharmacy Med Name: HYDROCHLOROTHIAZIDE 12.5MG CAPSULES] 60 capsule 1     Sig: TAKE 1 CAPSULE(12.5 MG) BY MOUTH DAILY       Diuretics (Including Combos) Protocol Failed - 5/29/2024  3:16 AM        Failed - Medication indicated for associated diagnosis     Medication is associated with one or more of the following diagnoses:     Edema   Hypertension   Heart Failure   Meniere's Disease          Passed - Blood pressure under 140/90 in past 12 months     BP Readings from Last 3 Encounters:   04/15/24 126/70   12/12/23 118/60   07/12/23 120/74       No data recorded            Passed - Medication is active on med list        Passed - Has GFR on file in past 12 months and most recent value is normal        Passed - Recent (12 mo) or future (90 days) visit within the authorizing provider's specialty     The patient must have completed an in-person or virtual visit within the past 12 months or has a future visit scheduled within the next 90 days with the authorizing provider s specialty.  Urgent care and e-visits do not quality as an office visit for this protocol.          Passed - Patient is age 18 or older        Passed - No active pregancy on record        Passed - No positive pregnancy test in past 12 months

## 2024-08-15 ENCOUNTER — PATIENT OUTREACH (OUTPATIENT)
Dept: CARE COORDINATION | Facility: CLINIC | Age: 69
End: 2024-08-15
Payer: COMMERCIAL

## 2024-09-03 DIAGNOSIS — R82.994 HYPERCALCIURIA: ICD-10-CM

## 2024-09-03 RX ORDER — HYDROCHLOROTHIAZIDE 12.5 MG/1
1 CAPSULE ORAL DAILY
Qty: 90 CAPSULE | Refills: 0 | Status: SHIPPED | OUTPATIENT
Start: 2024-09-03 | End: 2024-09-06

## 2024-09-03 NOTE — TELEPHONE ENCOUNTER
Requested Prescriptions   Pending Prescriptions Disp Refills    hydrochlorothiazide (MICROZIDE) 12.5 MG capsule 90 capsule 0     Sig: Take 1 capsule (12.5 mg) by mouth daily.       Diuretics (Including Combos) Protocol Failed - 9/3/2024  7:32 AM        Failed - Medication indicated for associated diagnosis     Medication is associated with one or more of the following diagnoses:     Edema   Hypertension   Heart Failure   Meniere's Disease   Bilateral localized swelling of lower limbs   Pulmonary Hypertension          Passed - Blood pressure under 140/90 in past 12 months     BP Readings from Last 3 Encounters:   04/15/24 126/70   12/12/23 118/60   07/12/23 120/74       No data recorded            Passed - Medication is active on med list        Passed - Has GFR on file in past 12 months and most recent value is normal        Passed - Recent (12 mo) or future (90 days) visit within the authorizing provider's specialty     The patient must have completed an in-person or virtual visit within the past 12 months or has a future visit scheduled within the next 90 days with the authorizing provider s specialty.  Urgent care and e-visits do not quality as an office visit for this protocol.          Passed - Patient is age 18 or older        Passed - No active pregancy on record        Passed - No positive pregnancy test in past 12 months

## 2024-09-06 RX ORDER — HYDROCHLOROTHIAZIDE 12.5 MG/1
1 CAPSULE ORAL DAILY
Qty: 90 CAPSULE | Refills: 2 | Status: SHIPPED | OUTPATIENT
Start: 2024-09-06

## 2024-09-12 ENCOUNTER — PATIENT OUTREACH (OUTPATIENT)
Dept: CARE COORDINATION | Facility: CLINIC | Age: 69
End: 2024-09-12
Payer: COMMERCIAL

## 2024-09-24 ENCOUNTER — TRANSFERRED RECORDS (OUTPATIENT)
Dept: MULTI SPECIALTY CLINIC | Facility: CLINIC | Age: 69
End: 2024-09-24

## 2024-11-30 ENCOUNTER — HEALTH MAINTENANCE LETTER (OUTPATIENT)
Age: 69
End: 2024-11-30

## 2025-01-16 ENCOUNTER — OFFICE VISIT (OUTPATIENT)
Dept: FAMILY MEDICINE | Facility: CLINIC | Age: 70
End: 2025-01-16
Payer: COMMERCIAL

## 2025-01-16 VITALS
TEMPERATURE: 97.9 F | DIASTOLIC BLOOD PRESSURE: 70 MMHG | RESPIRATION RATE: 18 BRPM | HEIGHT: 61 IN | OXYGEN SATURATION: 97 % | SYSTOLIC BLOOD PRESSURE: 110 MMHG | WEIGHT: 119.5 LBS | BODY MASS INDEX: 22.56 KG/M2 | HEART RATE: 67 BPM

## 2025-01-16 DIAGNOSIS — M25.511 ACUTE PAIN OF RIGHT SHOULDER: Primary | ICD-10-CM

## 2025-01-16 ASSESSMENT — PAIN SCALES - GENERAL: PAINLEVEL_OUTOF10: MILD PAIN (2)

## 2025-01-16 NOTE — PROGRESS NOTES
Assessment and Plan   69-year-old female who presents with shoulder pain going on the last couple weeks.  Patient has pain over her right anterior shoulder worse with raising her shoulder above her head as well as internal rotation.  Her range of motion and strength were normal though she had pain with resisted internal rotation.  Both her Krishnan and her drop can test were mildly painful.  Otherwise unremarkable.  I suspect a tendinitis possibly short biceps versus rotator cuff given location and history as well as exam.  My recommendations as are below.    1. Acute pain of right shoulder (Primary)  - start aleve daily for next week then as needed  - icing during the day and heat at night  - Rest. Given work restrictions for light duty until 2/7/24  - Given exercises to do daily    Follow up: If not improving over the next couple of weeks    Options for treatment and follow-up care were reviewed with the patient and/or guardian. Nancy Phillips and/or guardian engaged in the decision making process and verbalized understanding of the options discussed and agreed with the final plan.    Dr. Martin Banda         HPI:   Nancy Phillips is a 69 year old  female who presents for:    Chief Complaint   Patient presents with    Shoulder Pain     Right      HPI:   Right Shoulder pain: Patient tells me that over the last couple weeks she has had right shoulder pain.  Worsens when she is working above her head.  She works at [employer] and did have some more boxes and clothing she had to hang recently with [employer].  Otherwise no aggravating activity.  Location: coracoid and posterior deltoid   Duration: 3 weeks    Injury? Inciting activity? Possibly lifting boxes at [employer]   Radiation: None   Numbness/tingling? None   Weakness? no   Instability? no   Clicking/ catching? no   Imaging? No  Treatment? Aleve and icy hot help          PMHX:     Patient Active Problem List   Diagnosis    Osteoporosis - treatment 2nd round since  "2018    Personal history of breast cancer - s/p lumpectomy right    Alcohol use    Personal history of nicotine dependence     Asymptomatic menopausal state        Social History     Tobacco Use    Smoking status: Every Day     Current packs/day: 0.25     Average packs/day: 0.2 packs/day for 95.6 years (23.9 ttl pk-yrs)     Types: Cigarettes     Start date: 6/20/1974     Passive exposure: Current    Smokeless tobacco: Never   Vaping Use    Vaping status: Never Used    Passive vaping exposure: Yes   Substance Use Topics    Alcohol use: Yes     Alcohol/week: 6.0 - 8.0 standard drinks of alcohol    Drug use: No       Social History     Social History Narrative    Not on file       No Known Allergies    No results found for this or any previous visit (from the past 24 hours).         Review of Systems:    ROS: 10 point ROS neg other than the symptoms noted above in the HPI.         Physical Exam:     Vitals:    01/16/25 1145   BP: 110/70   Pulse: 67   Resp: 18   Temp: 97.9  F (36.6  C)   TempSrc: Temporal   SpO2: 97%   Weight: 54.2 kg (119 lb 8 oz)   Height: 1.549 m (5' 1\")     Body mass index is 22.58 kg/m .    General appearance: Alert, cooperative, no distress, appears stated age  Head: Normocephalic, atraumatic, without obvious abnormality  Eyes: Pupils equal round, reactive.  Conjunctiva clear.  Nose: Nares normal, no drainage.  Throat: Lips, mucosa, tongue normal mucosa pink and moist  Neck: Supple, symmetric, trachea midline,  Right Shoulder:   Inspection: asymmetry? no; dyskinesis? no   ROM:   Active - forward flexion - full/ abduction - full/ int rot - symmetric/ ext rot - symmetric   Passive- forward flexion - full/ abduction - full   Strength: deltoid/supraspinatous - 5/5; infraspinatous/ teres minor - 5/5; subscapularis - 5/5   Maneuvers:   RTC - empty can - mildly painful  Impingement - Krishnan - mildly painful  AC - cross arm - neg   Biceps - Speed's - neg  Labrum - Ibrara's - neg  Instability - " Apprehension - neg   Palpation: AC - neg; Acromion/ supraspinatus - neg; scapula - neg; bicipital groove - neg

## 2025-01-16 NOTE — LETTER
January 16, 2025      Nancy Phillips  [address] [place] [address]        To Whom It May Concern:    Nancy Phillips was seen in our clinic. She may return to work with the following: limited to light duty - lifting no greater than 10 pounds, no shoveling, no overhead work, no repetitive hanging for more than 15 minutes. Observe these restrictions until 2/7/25    Sincerely,    Martin Luna    Electronically signed

## 2025-03-24 NOTE — PROGRESS NOTES
"Subjective:    Established patient    Nancy Phillips is a 69 year old female who presents for osteoporosis. The following is a comprehensive summary of her Endocrine care to date.    # Osteoporosis    We previously reviewed the osteoporosis dictation template.     DEXA 6/18/2021:   -Lumbar spine stable compared to 5/2019  -Total mean hip stable compared to 5/2019    DEXA 11/9/2022:  -L1 - L4 T-score -2.7, stable compared to 2021   -Lowest T-score at the hips is -2.2 at the left femoral neck, with \"significant\" -1.8% decline at the total mean hip compared to 6/2021    DEXA 12/6/2023:  -L1 - L4 T-score -2.6 and stable compared to 2022  -Lowest overall T-score at the hips is -2.2 at the left femoral neck and left total hip, total mean hip BMD stable compared to 2022  -TBS T-score: -4.5    Thoracic and lumbar spine XR 12/6/2023: no obvious compression fracture, difficult to clearly see T1 and T3 on this image     CT chest 5/2/2024:  -Per radiology: New since 2020, though probably chronic, minimal superior endplate compression deformities at T1 and T3; I agree with the radiology read    -3/25/2025: Nancy denies trauma to the back over the past few years and she has no back pain     2020: rib fractures and transverse process fractures of L2 - L4 after a fall backwards while on a single step. No other fractures.     Balance is good. No recent falls.     No prior nephrolithiasis. Alcoholism. Active tobacco use.    As of 12/2023 Nancy has completed a complete evaluation for secondary causes of increased fracture risk with the following notable findings:  -No prior hypercalcemia, note labs from 6/6/2023 show uncorrected serum calcium 10.4 mg/dL (ULN 10.2), albumin 4.5 g/dL, and corrected serum calcium 10.0 mg/dL  -1 prior PTH assay, 1/11/2024: PTH mid normal, minerals normal  -1/2024: 25-OH vitamin D mid normal   -Bone specific alkaline phosphatase middle of premenopausal ref range     Fosamax:  -Fosamax - 5 year course " likely around 2010 - 2015  -Fosamax 70 mg once weekly - resumed in 2018 (unclear when in 2018 but she was on it as of 5/2018) and taken until ~early 2024 (we agreed on a drug holiday at this time), rare missed doses, she takes it appropriately to maximize absorption and minimize the risk of esophagitis      No other pharmacologic therapy to reduce fracture risk.     No GERD. No recent/upcoming tooth extraction or dental implant.     History of breast cancer in 2006, treated with surgery and radiation. No chemotherapy. Prior lacunar infarcts, no prior MI.      She exercises.     # Idiopathic hypercalciuria    6/2023: 24 hour urine calcium 350 mg (2.2 L collection)     12/2023: Regarding her hypercalciuria we need to ensure this is not iatrogenic. We again reviewed optimization of calcium and vitamin D intake in detail and aiming for no more than 1200 mg of calcium intake daily and this can be achieved through either diet alone or diet plus supplementation.  She will make sure she is taking in 1200 mg of calcium daily and not exceeding this.    1/11/2024: PTH mid normal, minerals normal, 25-OH vitamin D mid normal, 24 hour urine calcium 310 mg (2.0 L) collection      1/18/2024: rx HCTZ 12.5 mg daily     2/2024: BMP satisfactory     3/20/2024: 24 hour urine calcium 190 mg (1.9 L collection)     3/25/2025: she continues on HCTZ 12.5 mg daily, well tolerated. She takes calcium and vitamin D and aims for ~1200 mg of calcium intake/day    Objective:    BMI 22.7 kg/meters squared, blood pressure 116/70.    6/2023: BMI 21.98 kg/m2, /66. No scleral icterus. Dentition reasonable. Mild kyphosis. Radial pulse with a RRR.     Assessment/Plan:    # Osteoporosis  # Idiopathic hypercalciuria    Continue hydrochlorothiazide 12.5 mg once daily.  This medicine promotes reabsorption of calcium from the kidney into the bloodstream, which reduces urine calcium thus minimizing the risk of kidney stones, and promotes bone  mineralization.    Aim for 1200 mg of calcium daily, this includes all sources (diet plus pills).  Continue vitamin D.    Go for blood work in the coming weeks and on the day of the blood work return your 24-hour urine collection to assess urine calcium. Send me a My Chart message when you complete the testing and I'll review the results and get back to you.     Return to see me in 1 year and at that time we will repeat lab work and a bone density test.  I will place orders when I see your pending blood work.    30 minutes spent on the date of the encounter doing chart review, history and exam, documentation and further activities as noted above.     The longitudinal plan of care for the diagnosis(es)/condition(s) as documented were addressed during this visit. Due to the added complexity in care, I will continue to support Nancy in the subsequent management and with ongoing continuity of care.       We also reviewed limiting alcohol intake and tobacco cessation in detail. She will let me know if she wants referrals to help with cessation.    30 minutes spent on the date of the encounter doing chart review, history and exam, documentation and further activities as noted above.

## 2025-03-25 ENCOUNTER — OFFICE VISIT (OUTPATIENT)
Dept: ENDOCRINOLOGY | Facility: CLINIC | Age: 70
End: 2025-03-25
Payer: COMMERCIAL

## 2025-03-25 ENCOUNTER — LAB (OUTPATIENT)
Dept: LAB | Facility: CLINIC | Age: 70
End: 2025-03-25
Payer: COMMERCIAL

## 2025-03-25 VITALS
WEIGHT: 120.1 LBS | BODY MASS INDEX: 22.68 KG/M2 | HEART RATE: 64 BPM | HEIGHT: 61 IN | SYSTOLIC BLOOD PRESSURE: 116 MMHG | DIASTOLIC BLOOD PRESSURE: 70 MMHG | OXYGEN SATURATION: 97 %

## 2025-03-25 DIAGNOSIS — M81.0 AGE-RELATED OSTEOPOROSIS WITHOUT CURRENT PATHOLOGICAL FRACTURE: Primary | ICD-10-CM

## 2025-03-25 DIAGNOSIS — Z92.29 HISTORY OF ALENDRONATE THERAPY: ICD-10-CM

## 2025-03-25 DIAGNOSIS — Z72.0 TOBACCO ABUSE: ICD-10-CM

## 2025-03-25 DIAGNOSIS — R82.994 HYPERCALCIURIA: ICD-10-CM

## 2025-03-25 DIAGNOSIS — F10.20 ALCOHOLISM (H): ICD-10-CM

## 2025-03-25 DIAGNOSIS — M81.0 AGE-RELATED OSTEOPOROSIS WITHOUT CURRENT PATHOLOGICAL FRACTURE: ICD-10-CM

## 2025-03-25 DIAGNOSIS — Z78.0 POSTMENOPAUSAL STATUS: ICD-10-CM

## 2025-03-25 PROCEDURE — 3074F SYST BP LT 130 MM HG: CPT | Performed by: INTERNAL MEDICINE

## 2025-03-25 PROCEDURE — 3078F DIAST BP <80 MM HG: CPT | Performed by: INTERNAL MEDICINE

## 2025-03-25 PROCEDURE — 99214 OFFICE O/P EST MOD 30 MIN: CPT | Performed by: INTERNAL MEDICINE

## 2025-03-25 NOTE — LETTER
"3/25/2025      Nancy Phillips  [address] [place] [address]      Dear Colleague,    Thank you for referring your patient, Nancy Phillips, to the [facility]. Please see a copy of my visit note below.    Subjective:    Established patient    Nancy Phillips is a 69 year old female who presents for osteoporosis. The following is a comprehensive summary of her Endocrine care to date.    # Osteoporosis    We previously reviewed the osteoporosis dictation template.     DEXA 6/18/2021:   -Lumbar spine stable compared to 5/2019  -Total mean hip stable compared to 5/2019    DEXA 11/9/2022:  -L1 - L4 T-score -2.7, stable compared to 2021   -Lowest T-score at the hips is -2.2 at the left femoral neck, with \"significant\" -1.8% decline at the total mean hip compared to 6/2021    DEXA 12/6/2023:  -L1 - L4 T-score -2.6 and stable compared to 2022  -Lowest overall T-score at the hips is -2.2 at the left femoral neck and left total hip, total mean hip BMD stable compared to 2022  -TBS T-score: -4.5    Thoracic and lumbar spine XR 12/6/2023: no obvious compression fracture, difficult to clearly see T1 and T3 on this image     CT chest 5/2/2024:  -Per radiology: New since 2020, though probably chronic, minimal superior endplate compression deformities at T1 and T3; I agree with the radiology read    -3/25/2025: Nancy denies trauma to the back over the past few years and she has no back pain     2020: rib fractures and transverse process fractures of L2 - L4 after a fall backwards while on a single step. No other fractures.     Balance is good. No recent falls.     No prior nephrolithiasis. Alcoholism. Active tobacco use.    As of 12/2023 Nancy has completed a complete evaluation for secondary causes of increased fracture risk with the following notable findings:  -No prior hypercalcemia, note labs from 6/6/2023 show uncorrected serum calcium 10.4 mg/dL (ULN 10.2), albumin 4.5 g/dL, and " corrected serum calcium 10.0 mg/dL  -1 prior PTH assay, 1/11/2024: PTH mid normal, minerals normal  -1/2024: 25-OH vitamin D mid normal   -Bone specific alkaline phosphatase middle of premenopausal ref range     Fosamax:  -Fosamax - 5 year course likely around 2010 - 2015  -Fosamax 70 mg once weekly - resumed in 2018 (unclear when in 2018 but she was on it as of 5/2018) and taken until ~early 2024 (we agreed on a drug holiday at this time), rare missed doses, she takes it appropriately to maximize absorption and minimize the risk of esophagitis      No other pharmacologic therapy to reduce fracture risk.     No GERD. No recent/upcoming tooth extraction or dental implant.     History of breast cancer in 2006, treated with surgery and radiation. No chemotherapy. Prior lacunar infarcts, no prior MI.      She exercises.     # Idiopathic hypercalciuria    6/2023: 24 hour urine calcium 350 mg (2.2 L collection)     12/2023: Regarding her hypercalciuria we need to ensure this is not iatrogenic. We again reviewed optimization of calcium and vitamin D intake in detail and aiming for no more than 1200 mg of calcium intake daily and this can be achieved through either diet alone or diet plus supplementation.  She will make sure she is taking in 1200 mg of calcium daily and not exceeding this.    1/11/2024: PTH mid normal, minerals normal, 25-OH vitamin D mid normal, 24 hour urine calcium 310 mg (2.0 L) collection      1/18/2024: rx HCTZ 12.5 mg daily     2/2024: BMP satisfactory     3/20/2024: 24 hour urine calcium 190 mg (1.9 L collection)     3/25/2025: she continues on HCTZ 12.5 mg daily, well tolerated. She takes calcium and vitamin D and aims for ~1200 mg of calcium intake/day    Objective:    BMI 22.7 kg/meters squared, blood pressure 116/70.    6/2023: BMI 21.98 kg/m2, /66. No scleral icterus. Dentition reasonable. Mild kyphosis. Radial pulse with a RRR.     Assessment/Plan:    # Osteoporosis  # Idiopathic  hypercalciuria    Continue hydrochlorothiazide 12.5 mg once daily.  This medicine promotes reabsorption of calcium from the kidney into the bloodstream, which reduces urine calcium thus minimizing the risk of kidney stones, and promotes bone mineralization.    Aim for 1200 mg of calcium daily, this includes all sources (diet plus pills).  Continue vitamin D.    Go for blood work in the coming weeks and on the day of the blood work return your 24-hour urine collection to assess urine calcium. Send me a My Chart message when you complete the testing and I'll review the results and get back to you.     Return to see me in 1 year and at that time we will repeat lab work and a bone density test.  I will place orders when I see your pending blood work.    30 minutes spent on the date of the encounter doing chart review, history and exam, documentation and further activities as noted above.     The longitudinal plan of care for the diagnosis(es)/condition(s) as documented were addressed during this visit. Due to the added complexity in care, I will continue to support Nancy in the subsequent management and with ongoing continuity of care.       We also reviewed limiting alcohol intake and tobacco cessation in detail. She will let me know if she wants referrals to help with cessation.    30 minutes spent on the date of the encounter doing chart review, history and exam, documentation and further activities as noted above.       Again, thank you for allowing me to participate in the care of your patient.        Sincerely,        Mickey Adams MD    Electronically signed

## 2025-04-01 ENCOUNTER — LAB (OUTPATIENT)
Dept: LAB | Facility: CLINIC | Age: 70
End: 2025-04-01
Payer: COMMERCIAL

## 2025-04-01 DIAGNOSIS — M81.0 AGE-RELATED OSTEOPOROSIS WITHOUT CURRENT PATHOLOGICAL FRACTURE: ICD-10-CM

## 2025-04-01 DIAGNOSIS — R82.994 HYPERCALCIURIA: ICD-10-CM

## 2025-04-01 LAB
ALBUMIN SERPL BCG-MCNC: 4.5 G/DL (ref 3.5–5.2)
ANION GAP SERPL CALCULATED.3IONS-SCNC: 12 MMOL/L (ref 7–15)
BUN SERPL-MCNC: 18.6 MG/DL (ref 8–23)
CALCIUM 24H UR-MRATE: 0.21 G/SPEC (ref 0.1–0.3)
CALCIUM SERPL-MCNC: 9.7 MG/DL (ref 8.8–10.4)
CALCIUM UR-MCNC: 9.6 MG/DL
CHLORIDE SERPL-SCNC: 98 MMOL/L (ref 98–107)
COLLECT DURATION TIME UR: 24 H
CREAT SERPL-MCNC: 0.62 MG/DL (ref 0.51–0.95)
EGFRCR SERPLBLD CKD-EPI 2021: >90 ML/MIN/1.73M2
GLUCOSE SERPL-MCNC: 87 MG/DL (ref 70–99)
HCO3 SERPL-SCNC: 27 MMOL/L (ref 22–29)
POTASSIUM SERPL-SCNC: 3.8 MMOL/L (ref 3.4–5.3)
SODIUM SERPL-SCNC: 137 MMOL/L (ref 135–145)
SPECIMEN VOL UR: 2150 ML

## 2025-04-01 PROCEDURE — 82040 ASSAY OF SERUM ALBUMIN: CPT

## 2025-04-01 PROCEDURE — 36415 COLL VENOUS BLD VENIPUNCTURE: CPT

## 2025-04-01 PROCEDURE — 80048 BASIC METABOLIC PNL TOTAL CA: CPT

## 2025-04-21 SDOH — HEALTH STABILITY: PHYSICAL HEALTH: ON AVERAGE, HOW MANY DAYS PER WEEK DO YOU ENGAGE IN MODERATE TO STRENUOUS EXERCISE (LIKE A BRISK WALK)?: 1 DAY

## 2025-04-21 SDOH — HEALTH STABILITY: PHYSICAL HEALTH: ON AVERAGE, HOW MANY MINUTES DO YOU ENGAGE IN EXERCISE AT THIS LEVEL?: 40 MIN

## 2025-04-21 ASSESSMENT — SOCIAL DETERMINANTS OF HEALTH (SDOH): HOW OFTEN DO YOU GET TOGETHER WITH FRIENDS OR RELATIVES?: ONCE A WEEK

## 2025-04-23 ENCOUNTER — OFFICE VISIT (OUTPATIENT)
Dept: FAMILY MEDICINE | Facility: CLINIC | Age: 70
End: 2025-04-23
Payer: COMMERCIAL

## 2025-04-23 VITALS
SYSTOLIC BLOOD PRESSURE: 114 MMHG | RESPIRATION RATE: 16 BRPM | OXYGEN SATURATION: 99 % | TEMPERATURE: 97.9 F | HEIGHT: 61 IN | WEIGHT: 121.5 LBS | HEART RATE: 71 BPM | DIASTOLIC BLOOD PRESSURE: 72 MMHG | BODY MASS INDEX: 22.94 KG/M2

## 2025-04-23 DIAGNOSIS — F10.20 ALCOHOLISM (H): ICD-10-CM

## 2025-04-23 DIAGNOSIS — Z87.891 HISTORY OF TOBACCO USE, PRESENTING HAZARDS TO HEALTH: ICD-10-CM

## 2025-04-23 DIAGNOSIS — M81.0 AGE-RELATED OSTEOPOROSIS WITHOUT CURRENT PATHOLOGICAL FRACTURE: ICD-10-CM

## 2025-04-23 DIAGNOSIS — Z12.31 VISIT FOR SCREENING MAMMOGRAM: ICD-10-CM

## 2025-04-23 DIAGNOSIS — Z23 NEED FOR VACCINATION: ICD-10-CM

## 2025-04-23 DIAGNOSIS — Z13.6 SCREENING FOR CARDIOVASCULAR CONDITION: ICD-10-CM

## 2025-04-23 DIAGNOSIS — E78.00 ELEVATED CHOLESTEROL: ICD-10-CM

## 2025-04-23 DIAGNOSIS — Z00.00 ENCOUNTER FOR MEDICARE ANNUAL WELLNESS EXAM: Primary | ICD-10-CM

## 2025-04-23 DIAGNOSIS — R82.994 HYPERCALCIURIA: ICD-10-CM

## 2025-04-23 PROBLEM — Z78.0 ASYMPTOMATIC MENOPAUSAL STATE: Status: RESOLVED | Noted: 2022-08-01 | Resolved: 2025-04-23

## 2025-04-23 RX ORDER — ASPIRIN 81 MG/1
81 TABLET, COATED ORAL DAILY
Qty: 30 TABLET | Refills: 3 | Status: SHIPPED | OUTPATIENT
Start: 2025-04-23

## 2025-04-23 NOTE — PROGRESS NOTES
Preventive Care Visit  [facility]  Catarina Lopez MD, Family Medicine  Apr 23, 2025      Assessment & Plan     Encounter for Medicare annual wellness exam  Routine screening and prevention.  Mammogram later this year, colonoscopy next year  - zoster vaccine recombinant adjuvanted (SHINGRIX) injection  Dispense: 0.5 mL; Refill: 0    Need for vaccination  Shingles sent to the pharmacy, covid and flu for the fall    History of tobacco use, presenting hazards to health  Reviewed last CT and will order 12 month follow up today.  No symptoms  - CT Chest Lung Cancer Screen Low Dose Without    Visit for screening mammogram  - MA Screen Bilateral w/[place]    Screening for cardiovascular condition  Discussed cholesterol slightly elevated last visit and will do fasting this time to monitor.  Recommend diet and exercise changes.  BP well controlled and recently refilled medication  - Lipid panel reflex to direct LDL Fasting  - aspirin (ASPIRIN LOW DOSE) 81 MG EC tablet  Dispense: 30 tablet; Refill: 3    Alcoholism (H)  Pt is not concerned.  Reviewed audit questionnaire    Elevated cholesterol  Reviewed last labs and will monitor    Age-related osteoporosis without current pathological fracture  Reviewed endocrine note and will continue to monitor    Hypercalciuria  Stable.  Seen by endocrine      Patient has been advised of split billing requirements and indicates understanding: Yes        Nicotine/Tobacco Cessation  She reports that she has been smoking cigarettes. She started smoking about 50 years ago. She has a 24 pack-year smoking history. She has been exposed to tobacco smoke. She has never used smokeless tobacco.  Nicotine/Tobacco Cessation Plan  Information offered: Patient not interested at this time      Counseling  Appropriate preventive services were addressed with this patient via screening, questionnaire, or discussion as appropriate for fall prevention, nutrition, physical activity,  Tobacco-use cessation, social engagement, weight loss and cognition.  Checklist reviewing preventive services available has been given to the patient.  Reviewed patient's diet, addressing concerns and/or questions.   She is at risk for lack of exercise and has been provided with information to increase physical activity for the benefit of her well-being.   The patient reports drinking more than 3 alcoholic drinks per day and/or more than 7 drhnks per week. The patient was counseled and given information about possible harmful effects of excessive alcohol intake.        Nedra Cruz is a 69 year old, presenting for the following:  Annual Visit (Annual wellness. Pt states I would like to discuss my posture, hurt to stand straight (upper shoulders)  )        4/23/2025     9:59 AM   Additional Questions   Roomed by Fatou Cruz   Accompanied by N/A           HPI  High school reunion 50 years this year.  Excited about that.  Seen by endocrine and doing well.  Issue with shoulder earlier this year and now acute pain better but still some decreased ROM.  Wanting to work on posture.      The longitudinal plan of care for the diagnosis(es)/condition(s) as documented were addressed during this visit. Due to the added complexity in care, I will continue to support Nancy in the subsequent management and with ongoing continuity of care.                Advance Care Planning    Discussed advance care planning with patient; informed AVS has link to [facility].        4/21/2025   General Health   How would you rate your overall physical health? Good   Feel stress (tense, anxious, or unable to sleep) Not at all         4/21/2025   Nutrition   Diet: Regular (no restrictions)         4/21/2025   Exercise   Days per week of moderate/strenous exercise 1 day   Average minutes spent exercising at this level 40 min   (!) EXERCISE CONCERN      4/21/2025   Social Factors   Frequency of gathering with friends or relatives Once a week    Worry food won't last until get money to buy more No   Food not last or not have enough money for food? No   Do you have housing? (Housing is defined as stable permanent housing and does not include staying ouside in a car, in a tent, in an abandoned building, in an overnight shelter, or couch-surfing.) Yes   Are you worried about losing your housing? No   Lack of transportation? No   Unable to get utilities (heat,electricity)? No         4/21/2025   Fall Risk   Fallen 2 or more times in the past year? No    No   Trouble with walking or balance? No    No       Multiple values from one day are sorted in reverse-chronological order          4/21/2025   Activities of Daily Living- Home Safety   Needs help with the following daily activites None of the above   Safety concerns in the home None of the above         4/21/2025   Dental   Dentist two times every year? Yes         4/21/2025   Hearing Screening   Hearing concerns? None of the above         4/21/2025   Driving Risk Screening   Patient/family members have concerns about driving No         4/21/2025   General Alertness/Fatigue Screening   Have you been more tired than usual lately? No         4/21/2025   Urinary Incontinence Screening   Bothered by leaking urine in past 6 months No         Today's PHQ-2 Score:       4/23/2025     9:56 AM   PHQ-2 ( 1999 [employer])   Q1: Little interest or pleasure in doing things 0   Q2: Feeling down, depressed or hopeless 0   PHQ-2 Score 0    Q1: Little interest or pleasure in doing things Not at all   Q2: Feeling down, depressed or hopeless Not at all   PHQ-2 Score 0       Patient-reported           4/21/2025   Substance Use   Alcohol more than 3/day or more than 7/wk Yes   How often do you have a drink containing alcohol 4 or more times a week   How many alcohol drinks on typical day 3 or 4   How often do you have 5+ drinks at one occasion Never   Audit 2/3 Score 1   How often not able to stop drinking once started Never   How  often failed to do what normally expected Never   How often needed first drink in am after a heavy drinking session Never   How often feeling of guilt or remorse after drinking Never   How often unable to remember what happened the night before Never   Have you or someone else been injured because of your drinking No   Has anyone been concerned or suggested you cut down on drinking Yes, during the last year   TOTAL SCORE - AUDIT 9   Do you have a current opioid prescription? No   How severe/bad is pain from 1 to 10? 0/10 (No Pain)   Do you use any other substances recreationally? (!) CANNABIS PRODUCTS     Social History     Tobacco Use    Smoking status: Every Day     Current packs/day: 0.25     Average packs/day: 0.3 packs/day for 95.8 years (24.0 ttl pk-yrs)     Types: Cigarettes     Start date: 6/20/1974     Passive exposure: Current    Smokeless tobacco: Never   Vaping Use    Vaping status: Never Used    Passive vaping exposure: Yes   Substance Use Topics    Alcohol use: Yes     Alcohol/week: 6.0 - 8.0 standard drinks of alcohol    Drug use: No           7/6/2023   LAST FHS-7 RESULTS   1st degree relative breast or ovarian cancer No   Any relative bilateral breast cancer No   Any male have breast cancer No   Any ONE woman have BOTH breast AND ovarian cancer No   Any woman with breast cancer before 50yrs No   2 or more relatives with breast AND/OR ovarian cancer No   2 or more relatives with breast AND/OR bowel cancer No              History of abnormal Pap smear: No - age 65 or older with adequate negative prior screening test results (3 consecutive negative cytology results, 2 consecutive negative cotesting results, or 2 consecutive negative HrHPV test results within 10 years, with the most recent test occurring within the recommended screening interval for the test used)        4/20/2017     9:36 AM   PAP / HPV   PAP Negative for squamous intraepithelial lesion or malignancy  Electronically signed by  "Catarina Ngyuen S, CT (ASCP) on 4/28/2017 at  1:37 PM        ASCVD Risk   The ASCVD Risk score (Mandeep DK, et al., 2019) failed to calculate for the following reasons:    The valid HDL cholesterol range is 20 to 100 mg/dL            Reviewed and updated as needed this visit by Provider                      Current providers sharing in care for this patient include:  Patient Care Team:  Catarina Lopez MD as PCP - General  Mickey Adams MD as Assigned Endocrinology Provider  Destiney Chowdary APRN CNP as Assigned PCP    The following health maintenance items are reviewed in Epic and correct as of today:  Health Maintenance   Topic Date Due    ZOSTER IMMUNIZATION (1 of 2) Never done    NICOTINE/TOBACCO CESSATION COUNSELING Q 1 YR  04/15/2025    COVID-19 Vaccine (7 - 2024-25 season) 04/29/2025    LUNG CANCER SCREENING  05/02/2025    MAMMO SCREENING  09/24/2025    MEDICARE ANNUAL WELLNESS VISIT  04/23/2026    ANNUAL REVIEW OF HM ORDERS  04/23/2026    FALL RISK ASSESSMENT  04/23/2026    COLORECTAL CANCER SCREENING  11/04/2026    DIABETES SCREENING  04/01/2028    DTAP/TDAP/TD IMMUNIZATION (2 - Td or Tdap) 05/07/2028    LIPID  04/15/2029    ADVANCE CARE PLANNING  04/23/2030    RSV VACCINE (1 - 1-dose 75+ series) 08/31/2032    DEXA  12/06/2038    PHQ-2 (once per calendar year)  Completed    INFLUENZA VACCINE  Completed    Pneumococcal Vaccine: 50+ Years  Completed    HPV IMMUNIZATION  Aged Out    MENINGITIS IMMUNIZATION  Aged Out    HEPATITIS C SCREENING  Discontinued    PAP  Discontinued         Review of Systems  Constitutional, HEENT, cardiovascular, pulmonary, gi and gu systems are negative, except as otherwise noted.     Objective    Exam  /72   Pulse 71   Temp 97.9  F (36.6  C) (Oral)   Resp 16   Ht 1.543 m (5' 0.73\")   Wt 55.1 kg (121 lb 8 oz)   SpO2 99%   BMI 23.16 kg/m     Estimated body mass index is 23.16 kg/m  as calculated from the following:    Height as of this encounter: 1.543 " "m (5' 0.73\").    Weight as of this encounter: 55.1 kg (121 lb 8 oz).    Physical Exam  GENERAL: alert and no distress  EYES: Eyes grossly normal to inspection, PERRL and conjunctivae and sclerae normal  HENT: ear canals and TM's normal, nose and mouth without ulcers or lesions  NECK: no adenopathy, no asymmetry, masses, or scars  RESP: lungs clear to auscultation - no rales, rhonchi or wheezes  CV: regular rate and rhythm, normal S1 S2, no S3 or S4, no murmur, click or rub, no peripheral edema  ABDOMEN: soft, nontender, no hepatosplenomegaly, no masses and bowel sounds normal  MS: no gross musculoskeletal defects noted, no edema  SKIN: no suspicious lesions or rashes  NEURO: Normal strength and tone, mentation intact and speech normal  PSYCH: mentation appears normal, affect normal/bright        4/23/2025   Mini Cog   Clock Draw Score 2 Normal   3 Item Recall 3 objects recalled   Mini Cog Total Score 5             4/15/2024   Vision Screen   Patient wears corrective lenses (select all that apply) NOT worn during vision screen;Comments   Vision Screen Results Pass       Signed Electronically by: Catarina Lopez MD    "

## 2025-04-23 NOTE — PATIENT INSTRUCTIONS
Patient Education   Preventive Care Advice   This is general advice given by our system to help you stay healthy. However, your care team may have specific advice just for you. Please talk to your care team about your preventive care needs.  Nutrition  Eat 5 or more servings of fruits and vegetables each day.  Try wheat bread, brown rice and whole grain pasta (instead of white bread, rice, and pasta).  Get enough calcium and vitamin D. Check the label on foods and aim for 100% of the RDA (recommended daily allowance).  Lifestyle  Exercise at least 150 minutes each week  (30 minutes a day, 5 days a week).  Do muscle strengthening activities 2 days a week. These help control your weight and prevent disease.  No smoking.  Wear sunscreen to prevent skin cancer.  Have a dental exam and cleaning every 6 months.  Yearly exams  See your health care team every year to talk about:  Any changes in your health.  Any medicines your care team has prescribed.  Preventive care, family planning, and ways to prevent chronic diseases.  Shots (vaccines)   HPV shots (up to age 26), if you've never had them before.  Hepatitis B shots (up to age 59), if you've never had them before.  COVID-19 shot: Get this shot when it's due.  Flu shot: Get a flu shot every year.  Tetanus shot: Get a tetanus shot every 10 years.  Pneumococcal, hepatitis A, and RSV shots: Ask your care team if you need these based on your risk.  Shingles shot (for age 50 and up)  General health tests  Diabetes screening:  Starting at age 35, Get screened for diabetes at least every 3 years.  If you are younger than age 35, ask your care team if you should be screened for diabetes.  Cholesterol test: At age 39, start having a cholesterol test every 5 years, or more often if advised.  Bone density scan (DEXA): At age 50, ask your care team if you should have this scan for osteoporosis (brittle bones).  Hepatitis C: Get tested at least once in your life.  STIs (sexually  transmitted infections)  Before age 24: Ask your care team if you should be screened for STIs.  After age 24: Get screened for STIs if you're at risk. You are at risk for STIs (including HIV) if:  You are sexually active with more than one person.  You don't use condoms every time.  You or a partner was diagnosed with a sexually transmitted infection.  If you are at risk for HIV, ask about PrEP medicine to prevent HIV.  Get tested for HIV at least once in your life, whether you are at risk for HIV or not.  Cancer screening tests  Cervical cancer screening: If you have a cervix, begin getting regular cervical cancer screening tests starting at age 21.  Breast cancer scan (mammogram): If you've ever had breasts, begin having regular mammograms starting at age 40. This is a scan to check for breast cancer.  Colon cancer screening: It is important to start screening for colon cancer at age 45.  Have a colonoscopy test every 10 years (or more often if you're at risk) Or, ask your provider about stool tests like a FIT test every year or Cologuard test every 3 years.  To learn more about your testing options, visit:   .  For help making a decision, visit:   https://bit.ly/fr01434.  Prostate cancer screening test: If you have a prostate, ask your care team if a prostate cancer screening test (PSA) at age 55 is right for you.  Lung cancer screening: If you are a current or former smoker ages 50 to 80, ask your care team if ongoing lung cancer screenings are right for you.  For informational purposes only. Not to replace the advice of your health care provider. Copyright   2023 [facility]. All rights reserved. Clinically reviewed by the [facility]. [employer] 312192 - REV 01/24.  9 Ways to Cut Back on Drinking  Maybe you've found yourself drinking more alcohol than you'd prefer. If you want to cut back, here are some ideas to try.    Think before you drink.  Do you really want a drink,  "or is it just a habit? If you're used to having a drink at a certain time, try doing something else then.     Look for substitutes.  Find some no-alcohol drinks that you enjoy, like flavored seltzer water, tea with honey, or tonic with a slice of lime. Or try alcohol-free beer or \"virgin\" cocktails (without the alcohol).     Drink more water.  Use water to quench your thirst. Drink a glass of water before you have any alcohol. Have another glass along with every drink or between drinks.     Shrink your drink.  For example, have a bottle of beer instead of a pint. Use a smaller glass for wine. Choose drinks with lower alcohol content (ABV%). Or use less liquor and more mixer in cocktails.     Slow down.  It's easy to drink quickly and without thinking about it. Pay attention, and make each drink last longer.     Do the math.  Total up how much you spend on alcohol each month. How much is that a year? If you cut back, what could you do with the money you save?     Take a break.  Choose a day or two each week when you won't drink at all. Notice how you feel on those days, physically and emotionally. How did you sleep? Do you feel better? Over time, add more break days.     Count calories.  Would you like to lose some weight? For some people that's a good motivator for cutting back. Figure out how many calories are in each drink. How many does that add up to in a day? In a week? In a month?     Practice saying no.  Be ready when someone offers you a drink. Try: \"Thanks, I've had enough.\" Or \"Thanks, but I'm cutting back.\" Or \"No, thanks. I feel better when I drink less.\"   Current as of: August 20, 2024  Content Version: 14.4 2024-2025 [employer].   Care instructions adapted under license by your healthcare professional. If you have questions about a medical condition or this instruction, always ask your healthcare professional. [employer] disclaims any warranty or liability for your use of " this information.  Substance Use Disorder: Care Instructions  Overview     You can improve your life and health by stopping your use of alcohol or drugs. When you don't drink or use drugs, you may feel and sleep better. You may get along better with your family, friends, and coworkers. There are medicines and programs that can help with substance use disorder.  How can you care for yourself at home?  Here are some ways to help you stay sober and prevent relapse.  If you have been given medicine to help keep you sober or reduce your cravings, be sure to take it exactly as prescribed.  Talk to your doctor about programs that can help you stop using drugs or drinking alcohol.  Do not keep alcohol or drugs in your home.  Plan ahead. Think about what you'll say if other people ask you to drink or use drugs. Try not to spend time with people who drink or use drugs.  Use the time and money spent on drinking or drugs to do something that's important to you.  Preventing a relapse  Have a plan to deal with relapse. Learn to recognize changes in your thinking that lead you to drink or use drugs. Get help before you start to drink or use drugs again.  Try to stay away from situations, friends, or places that may lead you to drink or use drugs.  If you feel the need to drink alcohol or use drugs again, seek help right away. Call a trusted friend or family member. Some people get support from organizations such as Narcotics Anonymous or Believe.in or from treatment facilities.  If you relapse, get help as soon as you can. Some people make a plan with another person that outlines what they want that person to do for them if they relapse. The plan usually includes how to handle the relapse and who to notify in case of relapse.  Don't give up. Remember that a relapse doesn't mean that you have failed. Use the experience to learn the triggers that lead you to drink or use drugs. Then quit again. Recovery is a lifelong process.  "Many people have several relapses before they are able to quit for good.  Follow-up care is a key part of your treatment and safety. Be sure to make and go to all appointments, and call your doctor if you are having problems. It's also a good idea to know your test results and keep a list of the medicines you take.  When should you call for help?   Call 911  anytime you think you may need emergency care. For example, call if you or someone else:    Has overdosed or has withdrawal signs. Be sure to tell the emergency workers that you are or someone else is using or trying to quit using drugs. Overdose or withdrawal signs may include:  Losing consciousness.  Seizure.  Seeing or hearing things that aren't there (hallucinations).     Is thinking or talking about suicide or harming others.   Where to get help 24 hours a day, 7 days a week   If you or someone you know talks about suicide, self-harm, a mental health crisis, a substance use crisis, or any other kind of emotional distress, get help right away. You can:    Call the Suicide and Crisis Lifeline at 988.     Call 9-473-705-MGJE (1-888.304.8289).     Text HOME to 854633 to access the Crisis Text Line.   Consider saving these numbers in your phone.  Go to [employer] for more information or to chat online.  Call your doctor now or seek immediate medical care if:    You are having withdrawal symptoms. These may include nausea or vomiting, sweating, shakiness, and anxiety.   Watch closely for changes in your health, and be sure to contact your doctor if:    You have a relapse.     You need more help or support to stop.   Where can you learn more?  Go to https://www.healthRingCentral.net/patiented  Enter H573 in the search box to learn more about \"Substance Use Disorder: Care Instructions.\"  Current as of: August 20, 2024  Content Version: 14.4    1156-7525 [employer].   Care instructions adapted under license by your healthcare professional. If you have " questions about a medical condition or this instruction, always ask your healthcare professional. [employer] disclaims any warranty or liability for your use of this information.

## 2025-04-24 ENCOUNTER — PATIENT OUTREACH (OUTPATIENT)
Dept: CARE COORDINATION | Facility: CLINIC | Age: 70
End: 2025-04-24
Payer: COMMERCIAL

## 2025-04-26 ENCOUNTER — HEALTH MAINTENANCE LETTER (OUTPATIENT)
Age: 70
End: 2025-04-26

## 2025-05-07 ENCOUNTER — HOSPITAL ENCOUNTER (OUTPATIENT)
Dept: CT IMAGING | Facility: HOSPITAL | Age: 70
Discharge: HOME | End: 2025-05-07
Attending: FAMILY MEDICINE
Payer: COMMERCIAL

## 2025-05-07 ENCOUNTER — LAB (OUTPATIENT)
Dept: LAB | Facility: CLINIC | Age: 70
End: 2025-05-07
Payer: COMMERCIAL

## 2025-05-07 DIAGNOSIS — Z13.6 SCREENING FOR CARDIOVASCULAR CONDITION: ICD-10-CM

## 2025-05-07 DIAGNOSIS — Z87.891 HISTORY OF TOBACCO USE, PRESENTING HAZARDS TO HEALTH: ICD-10-CM

## 2025-05-07 LAB
CHOLEST SERPL-MCNC: 228 MG/DL
FASTING STATUS PATIENT QL REPORTED: YES
HDLC SERPL-MCNC: 123 MG/DL
LDLC SERPL CALC-MCNC: 89 MG/DL
NONHDLC SERPL-MCNC: 105 MG/DL
TRIGL SERPL-MCNC: 80 MG/DL

## 2025-05-07 PROCEDURE — 71271 CT THORAX LUNG CANCER SCR C-: CPT

## 2025-05-07 PROCEDURE — 80061 LIPID PANEL: CPT

## 2025-05-07 PROCEDURE — 36415 COLL VENOUS BLD VENIPUNCTURE: CPT

## 2025-05-08 ENCOUNTER — RESULTS FOLLOW-UP (OUTPATIENT)
Dept: FAMILY MEDICINE | Facility: CLINIC | Age: 70
End: 2025-05-08

## 2025-05-09 ENCOUNTER — RESULTS FOLLOW-UP (OUTPATIENT)
Dept: FAMILY MEDICINE | Facility: CLINIC | Age: 70
End: 2025-05-09

## 2025-05-19 ENCOUNTER — TRANSFERRED RECORDS (OUTPATIENT)
Dept: HEALTH INFORMATION MANAGEMENT | Facility: CLINIC | Age: 70
End: 2025-05-19
Payer: COMMERCIAL

## 2025-08-25 ENCOUNTER — PATIENT OUTREACH (OUTPATIENT)
Dept: CARE COORDINATION | Facility: CLINIC | Age: 70
End: 2025-08-25
Payer: COMMERCIAL

## 2025-09-02 DIAGNOSIS — R82.994 HYPERCALCIURIA: ICD-10-CM

## 2025-09-03 RX ORDER — HYDROCHLOROTHIAZIDE 12.5 MG/1
1 CAPSULE ORAL DAILY
Qty: 90 CAPSULE | Refills: 1 | Status: SHIPPED | OUTPATIENT
Start: 2025-09-03 | End: 2025-09-04

## 2025-09-04 RX ORDER — HYDROCHLOROTHIAZIDE 12.5 MG/1
1 CAPSULE ORAL DAILY
Qty: 90 CAPSULE | Refills: 2 | Status: SHIPPED | OUTPATIENT
Start: 2025-09-04